# Patient Record
Sex: FEMALE | Race: WHITE | NOT HISPANIC OR LATINO | Employment: OTHER | URBAN - METROPOLITAN AREA
[De-identification: names, ages, dates, MRNs, and addresses within clinical notes are randomized per-mention and may not be internally consistent; named-entity substitution may affect disease eponyms.]

---

## 2023-10-20 ENCOUNTER — OFFICE VISIT (OUTPATIENT)
Dept: URGENT CARE | Facility: CLINIC | Age: 63
End: 2023-10-20
Payer: COMMERCIAL

## 2023-10-20 VITALS
HEIGHT: 61 IN | WEIGHT: 131 LBS | DIASTOLIC BLOOD PRESSURE: 60 MMHG | HEART RATE: 82 BPM | TEMPERATURE: 98 F | RESPIRATION RATE: 18 BRPM | SYSTOLIC BLOOD PRESSURE: 106 MMHG | BODY MASS INDEX: 24.73 KG/M2 | OXYGEN SATURATION: 98 %

## 2023-10-20 DIAGNOSIS — S90.812A ABRASION OF LEFT HEEL, INITIAL ENCOUNTER: Primary | ICD-10-CM

## 2023-10-20 PROCEDURE — 99203 PR OFFICE/OUTPT VISIT, NEW, LEVL III, 30-44 MIN: ICD-10-PCS | Mod: S$GLB,,,

## 2023-10-20 PROCEDURE — 99203 OFFICE O/P NEW LOW 30 MIN: CPT | Mod: S$GLB,,,

## 2023-10-20 NOTE — PROGRESS NOTES
"Subjective:      Patient ID: Alexandria Tompkins is a 63 y.o. female.    Vitals:  height is 5' 1" (1.549 m) and weight is 59.4 kg (131 lb). Her oral temperature is 98.4 °F (36.9 °C). Her blood pressure is 106/60 and her pulse is 82. Her respiration is 18 and oxygen saturation is 98%.     Chief Complaint: Heel Pain (Left heel cracking x 2 days)    This is a 63 y.o. female who presents today with a chief complaint of  Patient presents with Heel Pain, Left heel cracking x 2 days        Foot Injury   The incident occurred 1 to 3 hours ago. The incident occurred at home. There was no injury mechanism. The pain is present in the left heel. The pain is at a severity of 8/10. The pain is moderate. The pain has been Constant since onset. Associated symptoms include an inability to bear weight. She reports no foreign bodies present. The symptoms are aggravated by weight bearing and movement. She has tried non-weight bearing for the symptoms. The treatment provided no relief.       Constitution: Negative.   HENT: Negative.     Neck: neck negative.   Cardiovascular: Negative.    Eyes: Negative.    Respiratory: Negative.     Gastrointestinal: Negative.    Endocrine: negative.   Genitourinary: Negative.    Musculoskeletal:  Positive for joint pain.   Skin:         Cracked dry skin on left heel   Allergic/Immunologic: Negative.    Neurological: Negative.    Hematologic/Lymphatic: Negative.       Objective:     Physical Exam   Constitutional: She is oriented to person, place, and time.   HENT:   Head: Normocephalic and atraumatic.   Eyes: Conjunctivae are normal. Pupils are equal, round, and reactive to light. Extraocular movement intact   Neck: Neck supple.   Cardiovascular: Normal rate and normal pulses.   Pulmonary/Chest: Effort normal.   Abdominal: Normal appearance.   Musculoskeletal: Normal range of motion.         General: Normal range of motion.   Neurological: no focal deficit. She is alert, oriented to person, place, and time " and at baseline.   Skin:         Comments: See image of left heel   Psychiatric: Her behavior is normal. Mood, judgment and thought content normal.   Nursing note and vitals reviewed.      Assessment:     1. Abrasion of left heel, initial encounter          Plan:    Plan discussed with patient and in patient D/C papers    Abrasion of left heel, initial encounter  -     Ambulatory referral/consult to Podiatry

## 2023-10-20 NOTE — PATIENT INSTRUCTIONS
You were given a referral today, if you don not hear from someone within 3 business days please call the patient referral number at [phone] to schedule your referral appointment.      Its easy for the skin on your feet to become dry and cracked, especially in the winter. Fortunately, there are steps you can take to treat dry, cracked heels at home and prevent them from coming back.    To care for dry, cracked heels, follow these tips from board-certified dermatologists.    Limit baths and showers to 5-10 minutes. Bathing for too long can dry out the skin, making dry, cracked heels worse. Be gentle when blotting your skin dry with a towel.    Use a gentle, fragrance-free cleanser. This will help your feet retain their natural oils.    Moisturize within 5 minutes of bathing. Look for a moisturizing cream that contains 10-25% urea, alpha hydroxy acid, or salicylic acid, and apply it to your heels immediately after bathing while your skin is still damp and whenever your heels feel dry to lock in moisture.    Before bed, apply plain petroleum jelly. Consider wearing socks at night to avoid getting grease on your bedding.    Protect your heels. During the day, apply a liquid bandage over the cracks in your heels to create a protective barrier, which can help reduce pain, speed up healing, and stop germs from entering your skin.    Wear the proper shoes. If you have dry, cracked heels, avoid open-heeled shoes, such as flip flops or slingbacks, shoes that are worn down, or shoes that dont fit properly.

## 2023-10-23 ENCOUNTER — HOSPITAL ENCOUNTER (EMERGENCY)
Facility: HOSPITAL | Age: 63
Discharge: HOME | End: 2023-10-23
Attending: EMERGENCY MEDICINE
Payer: COMMERCIAL

## 2023-10-23 VITALS
WEIGHT: 134 LBS | HEART RATE: 102 BPM | SYSTOLIC BLOOD PRESSURE: 115 MMHG | HEIGHT: 62 IN | BODY MASS INDEX: 24.66 KG/M2 | RESPIRATION RATE: 12 BRPM | TEMPERATURE: 99 F | OXYGEN SATURATION: 95 % | DIASTOLIC BLOOD PRESSURE: 69 MMHG

## 2023-10-23 DIAGNOSIS — R55 SYNCOPE: ICD-10-CM

## 2023-10-23 DIAGNOSIS — R52 PAIN: ICD-10-CM

## 2023-10-23 DIAGNOSIS — M79.672 PAIN OF LEFT HEEL: Primary | ICD-10-CM

## 2023-10-23 LAB
ALBUMIN SERPL BCP-MCNC: 3.7 G/DL (ref 3.5–5.2)
ALP SERPL-CCNC: 277 U/L (ref 55–135)
ALT SERPL W/O P-5'-P-CCNC: 189 U/L (ref 10–44)
ANION GAP SERPL CALC-SCNC: 9 MMOL/L (ref 8–16)
AST SERPL-CCNC: 234 U/L (ref 10–40)
BASOPHILS # BLD AUTO: 0.02 K/UL (ref 0–0.2)
BASOPHILS NFR BLD: 0.3 % (ref 0–1.9)
BILIRUB SERPL-MCNC: 0.5 MG/DL (ref 0.1–1)
BUN SERPL-MCNC: 18 MG/DL (ref 8–23)
CALCIUM SERPL-MCNC: 9.6 MG/DL (ref 8.7–10.5)
CHLORIDE SERPL-SCNC: 103 MMOL/L (ref 95–110)
CO2 SERPL-SCNC: 23 MMOL/L (ref 23–29)
CREAT SERPL-MCNC: 0.9 MG/DL (ref 0.5–1.4)
DIFFERENTIAL METHOD: ABNORMAL
EOSINOPHIL # BLD AUTO: 0.1 K/UL (ref 0–0.5)
EOSINOPHIL NFR BLD: 1.5 % (ref 0–8)
ERYTHROCYTE [DISTWIDTH] IN BLOOD BY AUTOMATED COUNT: 12.4 % (ref 11.5–14.5)
EST. GFR  (NO RACE VARIABLE): >60 ML/MIN/1.73 M^2
GLUCOSE SERPL-MCNC: 279 MG/DL (ref 70–110)
HCT VFR BLD AUTO: 42.2 % (ref 37–48.5)
HGB BLD-MCNC: 13.9 G/DL (ref 12–16)
IMM GRANULOCYTES # BLD AUTO: 0.02 K/UL (ref 0–0.04)
IMM GRANULOCYTES NFR BLD AUTO: 0.3 % (ref 0–0.5)
LACTATE SERPL-SCNC: 1 MMOL/L (ref 0.5–2.2)
LYMPHOCYTES # BLD AUTO: 0.8 K/UL (ref 1–4.8)
LYMPHOCYTES NFR BLD: 10.9 % (ref 18–48)
MCH RBC QN AUTO: 30.2 PG (ref 27–31)
MCHC RBC AUTO-ENTMCNC: 32.9 G/DL (ref 32–36)
MCV RBC AUTO: 92 FL (ref 82–98)
MONOCYTES # BLD AUTO: 0.2 K/UL (ref 0.3–1)
MONOCYTES NFR BLD: 2.9 % (ref 4–15)
NEUTROPHILS # BLD AUTO: 6 K/UL (ref 1.8–7.7)
NEUTROPHILS NFR BLD: 84.1 % (ref 38–73)
NRBC BLD-RTO: 0 /100 WBC
PLATELET # BLD AUTO: 244 K/UL (ref 150–450)
PMV BLD AUTO: 10 FL (ref 9.2–12.9)
POCT GLUCOSE: 275 MG/DL (ref 70–110)
POTASSIUM SERPL-SCNC: 4.3 MMOL/L (ref 3.5–5.1)
PROT SERPL-MCNC: 7.8 G/DL (ref 6–8.4)
RBC # BLD AUTO: 4.6 M/UL (ref 4–5.4)
SODIUM SERPL-SCNC: 135 MMOL/L (ref 136–145)
WBC # BLD AUTO: 7.14 K/UL (ref 3.9–12.7)

## 2023-10-23 PROCEDURE — 96375 TX/PRO/DX INJ NEW DRUG ADDON: CPT

## 2023-10-23 PROCEDURE — 73630 X-RAY EXAM OF FOOT: CPT | Mod: TC,LT

## 2023-10-23 PROCEDURE — 93010 ELECTROCARDIOGRAM REPORT: CPT | Mod: ,,, | Performed by: INTERNAL MEDICINE

## 2023-10-23 PROCEDURE — 99285 EMERGENCY DEPT VISIT HI MDM: CPT

## 2023-10-23 PROCEDURE — 73630 XR FOOT COMPLETE 3 VIEW LEFT: ICD-10-PCS | Mod: 26,LT,, | Performed by: RADIOLOGY

## 2023-10-23 PROCEDURE — 83605 ASSAY OF LACTIC ACID: CPT | Performed by: NURSE PRACTITIONER

## 2023-10-23 PROCEDURE — 80053 COMPREHEN METABOLIC PANEL: CPT | Performed by: NURSE PRACTITIONER

## 2023-10-23 PROCEDURE — 96374 THER/PROPH/DIAG INJ IV PUSH: CPT

## 2023-10-23 PROCEDURE — 63600175 PHARM REV CODE 636 W HCPCS: Performed by: NURSE PRACTITIONER

## 2023-10-23 PROCEDURE — 93005 ELECTROCARDIOGRAM TRACING: CPT

## 2023-10-23 PROCEDURE — 73630 X-RAY EXAM OF FOOT: CPT | Mod: 26,LT,, | Performed by: RADIOLOGY

## 2023-10-23 PROCEDURE — 93010 EKG 12-LEAD: ICD-10-PCS | Mod: ,,, | Performed by: INTERNAL MEDICINE

## 2023-10-23 PROCEDURE — 96361 HYDRATE IV INFUSION ADD-ON: CPT

## 2023-10-23 PROCEDURE — 85025 COMPLETE CBC W/AUTO DIFF WBC: CPT | Performed by: NURSE PRACTITIONER

## 2023-10-23 PROCEDURE — 25000003 PHARM REV CODE 250: Performed by: NURSE PRACTITIONER

## 2023-10-23 PROCEDURE — 82962 GLUCOSE BLOOD TEST: CPT

## 2023-10-23 RX ORDER — ONDANSETRON 2 MG/ML
4 INJECTION INTRAMUSCULAR; INTRAVENOUS
Status: COMPLETED | OUTPATIENT
Start: 2023-10-23 | End: 2023-10-23

## 2023-10-23 RX ORDER — MORPHINE SULFATE 2 MG/ML
2 INJECTION, SOLUTION INTRAMUSCULAR; INTRAVENOUS
Status: COMPLETED | OUTPATIENT
Start: 2023-10-23 | End: 2023-10-23

## 2023-10-23 RX ORDER — URSODIOL 250 MG/1
1 TABLET, FILM COATED ORAL 4 TIMES DAILY
COMMUNITY

## 2023-10-23 RX ORDER — INSULIN DEGLUDEC 100 U/ML
INJECTION, SOLUTION SUBCUTANEOUS
COMMUNITY

## 2023-10-23 RX ORDER — LEVOTHYROXINE SODIUM 200 UG/1
1 TABLET ORAL DAILY
COMMUNITY

## 2023-10-23 RX ADMIN — MORPHINE SULFATE 2 MG: 2 INJECTION, SOLUTION INTRAMUSCULAR; INTRAVENOUS at 03:10

## 2023-10-23 RX ADMIN — SODIUM CHLORIDE 500 ML: 9 INJECTION, SOLUTION INTRAVENOUS at 03:10

## 2023-10-23 RX ADMIN — ONDANSETRON 4 MG: 2 INJECTION INTRAMUSCULAR; INTRAVENOUS at 03:10

## 2023-10-23 NOTE — DISCHARGE INSTRUCTIONS
Rest, increase fluids, lots of water and liquids.  Follow up with your clinic in [place] as needed.  Or, a local clinic referral has been given to you.  Call for appointment.  No signs of infection today.  Normal EKG, normal lab work, normal WBC, normal lactic.  No need for antibiotics at present time.  Return as needed.  Tylenol and or Motrin as needed. Noted elevated liver enzymes, to follow up with clinic for recheck.

## 2023-10-23 NOTE — ED PROVIDER NOTES
Encounter Date: 10/23/2023       History     Chief Complaint   Patient presents with    General Illness     Pt reports a wound to the left heel with reports of worsening pain/numbness moving up her left leg. Pt reports concerns of possible infection. Pt did have a syncopal episode while getting out of her vehicle into the wheelchair.      POV to ED with .  Patient complains of wound to her left heel.  States that she noticed the wound 10/19/23.  States that she has had similar wounds in the past.  States that she went to a local urgent care for antibiotics.  States that she was told she did not eat antibiotics.  She was sent home, told to keep Vaseline on the heel.  Patient states that she has been doing home therapy for her left heel.  Stating that it does look much better now.  But, she knows that infection has spread from the left heel all the way to her hip.  Patient was requesting antibiotics.  She denies fever or vomiting.  States that they are relocating from a location in the [place], about 5 hours away.  Moving to this area.  They will need local referrals for this area.  While getting out of the car, RN reporting that patient stated her heel pain was such that she may pass out.  At exam interview, patient denies chest pain or shortness of breadth.  No abdominal pain.  No other complaints.    The history is provided by the patient and the spouse.     Review of patient's allergies indicates:   Allergen Reactions    Sulfa (sulfonamide antibiotics)      Past Medical History:   Diagnosis Date    Cancer     Hypothyroidism, unspecified     Rheumatoid arthritis, unspecified     Sjogren's disease     Type 1 diabetes mellitus      Past Surgical History:   Procedure Laterality Date    PANCREAS SURGERY       No family history on file.  Social History     Tobacco Use    Smoking status: Never    Smokeless tobacco: Never     Review of Systems   Constitutional:  Negative for chills and fever.   Respiratory:   Negative for cough and shortness of breath.    Gastrointestinal:  Negative for abdominal pain, diarrhea, nausea and vomiting.   Musculoskeletal:         Left heel pain, concerns for heel infection.  Denies fall or injury   All other systems reviewed and are negative.      Physical Exam     Initial Vitals [10/23/23 1452]   BP Pulse Resp Temp SpO2   135/84 97 18 98.8 °F (37.1 °C) 100 %      MAP       --         Physical Exam    Nursing note and vitals reviewed.  Constitutional: She appears well-developed and well-nourished. No distress.   HENT:   Head: Normocephalic and atraumatic.   Mouth/Throat: Oropharynx is clear and moist.   Eyes: Pupils are equal, round, and reactive to light.   Cardiovascular:  Normal rate and regular rhythm.           Murmur heard.  Pulmonary/Chest: Breath sounds normal. No respiratory distress.   Abdominal: Abdomen is soft. Bowel sounds are normal. There is no abdominal tenderness.   Musculoskeletal:         General: Normal range of motion.      Comments: Noting dry cracked heels, left greater than right.  No redness or warmth.  No swelling.  No streaking, reporting tenderness to the left heel     Neurological: She is alert and oriented to person, place, and time. No sensory deficit. GCS score is 15. GCS eye subscore is 4. GCS verbal subscore is 5. GCS motor subscore is 6.   Skin: Skin is warm and dry. Capillary refill takes 2 to 3 seconds.   Psychiatric:   Flat affect, good eye contact         ED Course   Procedures  Labs Reviewed   CBC W/ AUTO DIFFERENTIAL - Abnormal; Notable for the following components:       Result Value    Lymph # 0.8 (*)     Mono # 0.2 (*)     Gran % 84.1 (*)     Lymph % 10.9 (*)     Mono % 2.9 (*)     All other components within normal limits   COMPREHENSIVE METABOLIC PANEL - Abnormal; Notable for the following components:    Sodium 135 (*)     Glucose 279 (*)     Alkaline Phosphatase 277 (*)      (*)      (*)     All other components within normal  limits   POCT GLUCOSE - Abnormal; Notable for the following components:    POCT Glucose 275 (*)     All other components within normal limits   LACTIC ACID, PLASMA     EKG Readings: (Independently Interpreted)   Initial Reading: No STEMI. Rhythm: Normal Sinus Rhythm. Heart Rate: 96. Ectopy: No Ectopy. Conduction: Normal. ST Segments: Normal ST Segments. T Waves: Normal. Axis: Normal. Clinical Impression: Normal Sinus Rhythm Other Impression: No acute STEMI   @ 1442 interpreted, reviewed and discussed with Dr. Segura     ECG Results              EKG 12-lead (Final result)  Result time 10/23/23 17:01:49      Final result by Interface, Lab In [facility] (10/23/23 17:01:49)                   Narrative:    Test Reason : R55,    Vent. Rate : 096 BPM     Atrial Rate : 096 BPM     P-R Int : 152 ms          QRS Dur : 088 ms      QT Int : 334 ms       P-R-T Axes : 048 046 056 degrees     QTc Int : 421 ms    Normal sinus rhythm  Normal ECG  No previous ECGs available  Confirmed by Colin Milan MD ([id number]) on 10/23/2023 5:01:42 PM    Referred By: [email or web address]   SELF           Confirmed By:Colin Milan MD                                  Imaging Results              X-Ray Foot Complete Left (Final result)  Result time 10/23/23 15:36:00      Final result by Prasanna Caban MD (10/23/23 15:36:00)                   Impression:      No acute radiographic findings of the left foot.      Electronically signed by: Prasanna Caban  Date:    10/23/2023  Time:    15:36               Narrative:    EXAMINATION:  XR FOOT COMPLETE 3 VIEW LEFT    CLINICAL HISTORY:  . Pain, unspecified    TECHNIQUE:  AP, lateral, and oblique views of the left foot were performed.    COMPARISON:  None    FINDINGS:  No acute fracture or dislocation.  No significant soft tissue swelling.    The joint spaces are preserved.  The tarsal bones are normal in appearance.  Normal tarsometatarsal alignment.    No radiopaque foreign body.                                    X-Rays:    Independently Interpreted Readings:   Other Readings:  EXAMINATION:  XR FOOT COMPLETE 3 VIEW LEFT     CLINICAL HISTORY:  . Pain, unspecified     TECHNIQUE:  AP, lateral, and oblique views of the left foot were performed.     COMPARISON:  None     FINDINGS:  No acute fracture or dislocation.  No significant soft tissue swelling.     The joint spaces are preserved.  The tarsal bones are normal in appearance.  Normal tarsometatarsal alignment.     No radiopaque foreign body.     Impression:     No acute radiographic findings of the left foot.        Medications   sodium chloride 0.9% bolus 500 mL 500 mL (0 mLs Intravenous Stopped 10/23/23 1606)   morphine injection 2 mg (2 mg Intravenous Given 10/23/23 1529)   ondansetron injection 4 mg (4 mg Intravenous Given 10/23/23 1529)     Medical Decision Making  Presents for evaluation of left heel pain, concern for infection, requesting antibiotics.  Lab work ordered, x-ray ordered.  Disposition pending  Differentials including but limited to dehydration, volume depletion, abnormal electrolytes, fracture, sprain, strain, abscess, cellulitis, heel spur  @ 1612 awaiting lab results  Plan of care, patient will be discharged home.  Diagnosis heel pain.  EKG sinus rhythm, orthostatics within normal limits, lab work unremarkable other than elevated blood sugar, history of diabetes.  Lactic within normal limits. Elevated liver enzymes, clinic referral for recheck. No signs of infection in her left heel.  She will be discharged home.  Medicated for pain in the ED.  To continue Tylenol and or Motrin as needed at home.  Clinic referral information is given for her to call to follow up with.  Again, patient requests antibiotics.  She was advised there was not a need for antibiotics at present time for an infection, that there is not an infection in her left heel. Discussed with Dr Segura    Amount and/or Complexity of Data Reviewed  Independent Historian: spouse     Details:  Spouse  Labs: ordered. Decision-making details documented in ED Course.  Radiology: ordered. Decision-making details documented in ED Course.  ECG/medicine tests: ordered. Decision-making details documented in ED Course.    Risk  OTC drugs.  Prescription drug management.               ED Course as of 10/23/23 1847   Mon Oct 23, 2023   1720 Comprehensive Metabolic Panel (CMP)   Final result 10/23 1520    Sodium 135  Glucose 279          POCT glucose                 Final result 10/23 1436    POCT Glucose 275       Complete Blood Count (CBC)                 Final result 10/23 1520    Lymph # 0.8  Mono # 0.2  Gran % 84.1  Lymph % 10.9  Mono % 2.9       [CP]      ED Course User Index  [CP] Alice Falk NP                    Clinical Impression:   Final diagnoses:  [R55] Syncope  [R52] Pain  [M79.672] Pain of left heel - atraumatic, no signs of infection (Primary)        ED Disposition Condition    Discharge Stable          ED Prescriptions    None       Follow-up Information       Follow up With Specialties Details Why Contact Info    Kay Mitchell MD Family Medicine In 3 days  [address] [facility]  [address]  [place] [id number] [phone]      Juan Pablo Denson DPM Podiatry Call in 3 days  [address] [place] [address]               Alice Falk NP  10/23/23 1612       Alice Falk NP  10/23/23 1718       Alice Falk NP  10/23/23 1720       Alice Falk NP  10/23/23 1847

## 2024-03-25 ENCOUNTER — HOSPITAL ENCOUNTER (EMERGENCY)
Facility: HOSPITAL | Age: 64
Discharge: HOME | End: 2024-03-25
Attending: EMERGENCY MEDICINE
Payer: COMMERCIAL

## 2024-03-25 VITALS
OXYGEN SATURATION: 96 % | SYSTOLIC BLOOD PRESSURE: 125 MMHG | BODY MASS INDEX: 24.66 KG/M2 | WEIGHT: 134 LBS | RESPIRATION RATE: 19 BRPM | TEMPERATURE: 98 F | HEART RATE: 70 BPM | HEIGHT: 62 IN | DIASTOLIC BLOOD PRESSURE: 58 MMHG

## 2024-03-25 DIAGNOSIS — Z79.4 DIABETES MELLITUS DUE TO UNDERLYING CONDITION WITHOUT COMPLICATION, WITH LONG-TERM CURRENT USE OF INSULIN: ICD-10-CM

## 2024-03-25 DIAGNOSIS — R07.9 CHEST PAIN: Primary | ICD-10-CM

## 2024-03-25 DIAGNOSIS — R20.2 NUMBNESS AND TINGLING IN LEFT HAND: ICD-10-CM

## 2024-03-25 DIAGNOSIS — R20.0 NUMBNESS AND TINGLING IN LEFT HAND: ICD-10-CM

## 2024-03-25 DIAGNOSIS — E08.9 DIABETES MELLITUS DUE TO UNDERLYING CONDITION WITHOUT COMPLICATION, WITH LONG-TERM CURRENT USE OF INSULIN: ICD-10-CM

## 2024-03-25 DIAGNOSIS — R29.818 ACUTE FOCAL NEUROLOGICAL DEFICIT: ICD-10-CM

## 2024-03-25 DIAGNOSIS — Z85.07 H/O PANCREATIC CANCER: ICD-10-CM

## 2024-03-25 DIAGNOSIS — N30.00 ACUTE CYSTITIS WITHOUT HEMATURIA: ICD-10-CM

## 2024-03-25 LAB
ALBUMIN SERPL BCP-MCNC: 3.5 G/DL (ref 3.5–5.2)
ALP SERPL-CCNC: 122 U/L (ref 55–135)
ALT SERPL W/O P-5'-P-CCNC: 35 U/L (ref 10–44)
ANION GAP SERPL CALC-SCNC: 10 MMOL/L (ref 8–16)
AST SERPL-CCNC: 32 U/L (ref 10–40)
BACTERIA #/AREA URNS HPF: ABNORMAL /HPF
BASOPHILS # BLD AUTO: 0.04 K/UL (ref 0–0.2)
BASOPHILS NFR BLD: 0.4 % (ref 0–1.9)
BILIRUB SERPL-MCNC: 0.5 MG/DL (ref 0.1–1)
BILIRUB UR QL STRIP: NEGATIVE
BNP SERPL-MCNC: 21 PG/ML (ref 0–99)
BUN SERPL-MCNC: 22 MG/DL (ref 8–23)
CALCIUM SERPL-MCNC: 9.5 MG/DL (ref 8.7–10.5)
CHLORIDE SERPL-SCNC: 105 MMOL/L (ref 95–110)
CHOLEST SERPL-MCNC: 134 MG/DL (ref 120–199)
CHOLEST/HDLC SERPL: 2.6 {RATIO} (ref 2–5)
CLARITY UR: CLEAR
CO2 SERPL-SCNC: 23 MMOL/L (ref 23–29)
COLOR UR: YELLOW
CREAT SERPL-MCNC: 0.8 MG/DL (ref 0.5–1.4)
DIFFERENTIAL METHOD BLD: NORMAL
EOSINOPHIL # BLD AUTO: 0.3 K/UL (ref 0–0.5)
EOSINOPHIL NFR BLD: 3.2 % (ref 0–8)
ERYTHROCYTE [DISTWIDTH] IN BLOOD BY AUTOMATED COUNT: 12.7 % (ref 11.5–14.5)
EST. GFR  (NO RACE VARIABLE): >60 ML/MIN/1.73 M^2
GLUCOSE SERPL-MCNC: 160 MG/DL (ref 70–110)
GLUCOSE UR QL STRIP: NEGATIVE
HCT VFR BLD AUTO: 39.6 % (ref 37–48.5)
HDLC SERPL-MCNC: 51 MG/DL (ref 40–75)
HDLC SERPL: 38.1 % (ref 20–50)
HGB BLD-MCNC: 13.3 G/DL (ref 12–16)
HGB UR QL STRIP: ABNORMAL
IMM GRANULOCYTES # BLD AUTO: 0.02 K/UL (ref 0–0.04)
IMM GRANULOCYTES NFR BLD AUTO: 0.2 % (ref 0–0.5)
INR PPP: 1 (ref 0.8–1.2)
KETONES UR QL STRIP: NEGATIVE
LDLC SERPL CALC-MCNC: 68.8 MG/DL (ref 63–159)
LEUKOCYTE ESTERASE UR QL STRIP: ABNORMAL
LYMPHOCYTES # BLD AUTO: 2.8 K/UL (ref 1–4.8)
LYMPHOCYTES NFR BLD: 31.3 % (ref 18–48)
MCH RBC QN AUTO: 30.9 PG (ref 27–31)
MCHC RBC AUTO-ENTMCNC: 33.6 G/DL (ref 32–36)
MCV RBC AUTO: 92 FL (ref 82–98)
MICROSCOPIC COMMENT: ABNORMAL
MONOCYTES # BLD AUTO: 0.6 K/UL (ref 0.3–1)
MONOCYTES NFR BLD: 6.9 % (ref 4–15)
NEUTROPHILS # BLD AUTO: 5.2 K/UL (ref 1.8–7.7)
NEUTROPHILS NFR BLD: 58 % (ref 38–73)
NITRITE UR QL STRIP: NEGATIVE
NON-SQ EPI CELLS #/AREA URNS HPF: 2 /HPF
NONHDLC SERPL-MCNC: 83 MG/DL
NRBC BLD-RTO: 0 /100 WBC
OHS QRS DURATION: 96 MS
OHS QTC CALCULATION: 411 MS
PH UR STRIP: 6 [PH] (ref 5–8)
PLATELET # BLD AUTO: 278 K/UL (ref 150–450)
PMV BLD AUTO: 9.6 FL (ref 9.2–12.9)
POC PTINR: 1.1 (ref 0.9–1.2)
POCT GLUCOSE: 172 MG/DL (ref 70–110)
POCT GLUCOSE: 175 MG/DL (ref 70–110)
POCT GLUCOSE: 73 MG/DL (ref 70–110)
POTASSIUM SERPL-SCNC: 4.4 MMOL/L (ref 3.5–5.1)
PROT SERPL-MCNC: 7 G/DL (ref 6–8.4)
PROT UR QL STRIP: NEGATIVE
PROTHROMBIN TIME: 10.7 SEC (ref 9–12.5)
RBC # BLD AUTO: 4.3 M/UL (ref 4–5.4)
RBC #/AREA URNS HPF: 17 /HPF (ref 0–4)
SAMPLE: NORMAL
SODIUM SERPL-SCNC: 138 MMOL/L (ref 136–145)
SP GR UR STRIP: >1.03 (ref 1–1.03)
TRIGL SERPL-MCNC: 71 MG/DL (ref 30–150)
TROPONIN I SERPL DL<=0.01 NG/ML-MCNC: <0.006 NG/ML (ref 0–0.03)
TROPONIN I SERPL DL<=0.01 NG/ML-MCNC: <0.006 NG/ML (ref 0–0.03)
TSH SERPL DL<=0.005 MIU/L-ACNC: 1.55 UIU/ML (ref 0.4–4)
URN SPEC COLLECT METH UR: ABNORMAL
UROBILINOGEN UR STRIP-ACNC: NEGATIVE EU/DL
WBC # BLD AUTO: 8.99 K/UL (ref 3.9–12.7)
WBC #/AREA URNS HPF: >100 /HPF (ref 0–5)

## 2024-03-25 PROCEDURE — 81000 URINALYSIS NONAUTO W/SCOPE: CPT | Performed by: EMERGENCY MEDICINE

## 2024-03-25 PROCEDURE — 82962 GLUCOSE BLOOD TEST: CPT

## 2024-03-25 PROCEDURE — 84484 ASSAY OF TROPONIN QUANT: CPT | Performed by: EMERGENCY MEDICINE

## 2024-03-25 PROCEDURE — 93005 ELECTROCARDIOGRAM TRACING: CPT

## 2024-03-25 PROCEDURE — 99285 EMERGENCY DEPT VISIT HI MDM: CPT | Mod: 25

## 2024-03-25 PROCEDURE — 85610 PROTHROMBIN TIME: CPT | Performed by: EMERGENCY MEDICINE

## 2024-03-25 PROCEDURE — 87086 URINE CULTURE/COLONY COUNT: CPT | Performed by: EMERGENCY MEDICINE

## 2024-03-25 PROCEDURE — 83880 ASSAY OF NATRIURETIC PEPTIDE: CPT | Performed by: EMERGENCY MEDICINE

## 2024-03-25 PROCEDURE — 25500020 PHARM REV CODE 255

## 2024-03-25 PROCEDURE — 80061 LIPID PANEL: CPT | Performed by: EMERGENCY MEDICINE

## 2024-03-25 PROCEDURE — 85025 COMPLETE CBC W/AUTO DIFF WBC: CPT | Performed by: EMERGENCY MEDICINE

## 2024-03-25 PROCEDURE — 36415 COLL VENOUS BLD VENIPUNCTURE: CPT | Performed by: EMERGENCY MEDICINE

## 2024-03-25 PROCEDURE — 87077 CULTURE AEROBIC IDENTIFY: CPT | Performed by: EMERGENCY MEDICINE

## 2024-03-25 PROCEDURE — 93010 ELECTROCARDIOGRAM REPORT: CPT | Mod: ,,, | Performed by: GENERAL PRACTICE

## 2024-03-25 PROCEDURE — 85610 PROTHROMBIN TIME: CPT

## 2024-03-25 PROCEDURE — 84443 ASSAY THYROID STIM HORMONE: CPT | Performed by: EMERGENCY MEDICINE

## 2024-03-25 PROCEDURE — 87186 SC STD MICRODIL/AGAR DIL: CPT | Performed by: EMERGENCY MEDICINE

## 2024-03-25 PROCEDURE — 99900035 HC TECH TIME PER 15 MIN (STAT)

## 2024-03-25 PROCEDURE — 80053 COMPREHEN METABOLIC PANEL: CPT | Performed by: EMERGENCY MEDICINE

## 2024-03-25 PROCEDURE — 25000003 PHARM REV CODE 250: Performed by: EMERGENCY MEDICINE

## 2024-03-25 RX ORDER — CLOPIDOGREL BISULFATE 75 MG/1
75 TABLET ORAL DAILY
Status: DISCONTINUED | OUTPATIENT
Start: 2024-03-26 | End: 2024-03-25

## 2024-03-25 RX ORDER — CIPROFLOXACIN 500 MG/1
500 TABLET ORAL 2 TIMES DAILY
Qty: 20 TABLET | Refills: 0 | Status: SHIPPED | OUTPATIENT
Start: 2024-03-25 | End: 2024-03-25 | Stop reason: CLARIF

## 2024-03-25 RX ORDER — CLOPIDOGREL BISULFATE 75 MG/1
75 TABLET ORAL DAILY
Status: DISCONTINUED | OUTPATIENT
Start: 2024-03-25 | End: 2024-03-25 | Stop reason: HOSPADM

## 2024-03-25 RX ORDER — NAPROXEN SODIUM 220 MG/1
81 TABLET, FILM COATED ORAL
Status: COMPLETED | OUTPATIENT
Start: 2024-03-25 | End: 2024-03-25

## 2024-03-25 RX ORDER — NITROFURANTOIN 25; 75 MG/1; MG/1
100 CAPSULE ORAL 2 TIMES DAILY
Qty: 10 CAPSULE | Refills: 0 | Status: SHIPPED | OUTPATIENT
Start: 2024-03-25 | End: 2024-03-30

## 2024-03-25 RX ORDER — CLOPIDOGREL BISULFATE 75 MG/1
75 TABLET ORAL DAILY
Qty: 21 TABLET | Refills: 0 | OUTPATIENT
Start: 2024-03-25 | End: 2024-04-04

## 2024-03-25 RX ORDER — ASPIRIN 81 MG/1
81 TABLET ORAL DAILY
Qty: 30 TABLET | Refills: 0 | Status: SHIPPED | OUTPATIENT
Start: 2024-03-25 | End: 2025-03-25

## 2024-03-25 RX ADMIN — ASPIRIN 81 MG CHEWABLE TABLET 81 MG: 81 TABLET CHEWABLE at 04:03

## 2024-03-25 RX ADMIN — CLOPIDOGREL BISULFATE 75 MG: 75 TABLET, FILM COATED ORAL at 04:03

## 2024-03-25 RX ADMIN — IOHEXOL 75 ML: 350 INJECTION, SOLUTION INTRAVENOUS at 11:03

## 2024-03-25 NOTE — ED PROVIDER NOTES
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  Patient states symptoms began around 5:30 p.m. last night and patient went to bed around 6pm.  She woke up this morning and the symptoms were still there.  Review of patient's allergies indicates:   Allergen Reactions    Sulfa (sulfonamide antibiotics)      Past Medical History:   Diagnosis Date    Cancer     Hypothyroidism, unspecified     Rheumatoid arthritis, unspecified     Sjogren's disease     Type 1 diabetes mellitus      Past Surgical History:   Procedure Laterality Date    PANCREAS SURGERY       No family history on file.  Social History     Tobacco Use    Smoking status: Never    Smokeless tobacco: Never     Review of Systems   Constitutional:  Negative for fever.   HENT:  Negative for sore throat.    Respiratory:  Positive for choking (sensation). Negative for shortness of breath.    Cardiovascular:  Positive for chest pain.   Gastrointestinal:  Negative for nausea.   Genitourinary:  Negative for dysuria.   Musculoskeletal:  Positive for gait problem. Negative for back pain.   Skin:  Negative for rash.   Neurological:  Positive for dizziness, weakness and numbness.   Hematological:  Does not bruise/bleed easily.       Physical Exam     Initial Vitals [03/25/24 1104]   BP Pulse Resp Temp SpO2   (!) 108/57 89 18 98 °F (36.7 °C) 98 %      MAP       --         Physical Exam    Constitutional: Vital signs are normal. She appears well-developed and well-nourished.  Non-toxic appearance. No distress.   HENT:   Head: Normocephalic and atraumatic.   Eyes: EOM are normal. Pupils are  equal, round, and reactive to light.   Neck: Neck supple. No JVD present.   Normal range of motion.  Cardiovascular:  Normal rate, regular rhythm, normal heart sounds and intact distal pulses.     Exam reveals no gallop and no friction rub.       No murmur heard.  Pulmonary/Chest: Breath sounds normal. She has no wheezes. She has no rhonchi. She has no rales.   Abdominal: Abdomen is soft. Bowel sounds are normal. There is no abdominal tenderness. There is no rebound and no guarding.   Musculoskeletal:         General: Normal range of motion.      Cervical back: Normal range of motion and neck supple. No rigidity.     Neurological: She is alert and oriented to person, place, and time. She has normal reflexes. A sensory deficit is present. No cranial nerve deficit. She exhibits abnormal muscle tone. Coordination (dysmetria with finger-to-nose with the left upper extremity but not the right) abnormal. GCS eye subscore is 4. GCS verbal subscore is 5. GCS motor subscore is 6.   Paresthesia in her left hand and forearm.  Decreased  strength in the left hand compared to the right   Skin: Skin is warm and dry.   Psychiatric: She has a normal mood and affect. Her speech is normal and behavior is normal. She is not actively hallucinating.         ED Course   Procedures  Labs Reviewed   COMPREHENSIVE METABOLIC PANEL - Abnormal; Notable for the following components:       Result Value    Glucose 160 (*)     All other components within normal limits   URINALYSIS, REFLEX TO URINE CULTURE - Abnormal; Notable for the following components:    Specific Gravity, UA >1.030 (*)     Occult Blood UA 2+ (*)     Leukocytes, UA 3+ (*)     All other components within normal limits    Narrative:     Specimen Source->Urine   URINALYSIS MICROSCOPIC - Abnormal; Notable for the following components:    RBC, UA 17 (*)     WBC, UA >100 (*)     Non-Squam Epith 2 (*)     All other components within normal limits    Narrative:     Specimen  Source->Urine   POCT GLUCOSE - Abnormal; Notable for the following components:    POCT Glucose 172 (*)     All other components within normal limits   POCT GLUCOSE - Abnormal; Notable for the following components:    POCT Glucose 175 (*)     All other components within normal limits   CULTURE, URINE   CBC W/ AUTO DIFFERENTIAL   PROTIME-INR   TSH   LIPID PANEL   TROPONIN I   B-TYPE NATRIURETIC PEPTIDE   TROPONIN I   POCT GLUCOSE, HAND-HELD DEVICE   ISTAT PROCEDURE   POCT GLUCOSE   POCT PROTIME-INR     EKG Readings: (Independently Interpreted)   Rhythm: Normal Sinus Rhythm. Heart Rate: 69. Ectopy: No Ectopy. Conduction: Normal. ST Segments: Normal ST Segments. T Waves: Normal. Clinical Impression: Normal Sinus Rhythm       Imaging Results              X-Ray Chest AP Portable (Final result)  Result time 03/25/24 17:11:17      Final result by Zack Chowdhury MD (03/25/24 17:11:17)                   Impression:      No acute cardiopulmonary process.      Electronically signed by: Zack Chowdhury  Date:    03/25/2024  Time:    17:11               Narrative:    EXAMINATION:  XR CHEST AP PORTABLE    CLINICAL HISTORY:  Chest pain, unspecified    TECHNIQUE:  Single frontal view of the chest was performed.    COMPARISON:  None    FINDINGS:  Cardiomediastinal silhouette is within normal limits.  Lungs are well expanded and clear.  No focal consolidation, pneumothorax, or pleural effusion.  No acute bony changes.                                       MRI Brain Without Contrast (Final result)  Result time 03/25/24 15:11:29      Final result by Darshan Jain MD (03/25/24 15:11:29)                   Impression:      There is no acute abnormality.      Electronically signed by: Darshan Jain MD  Date:    03/25/2024  Time:    15:11               Narrative:    EXAM:  MRI BRAIN WITHOUT CONTRAST    CLINICAL HISTORY:  Neuro deficit, acute, stroke suspected; .    COMPARISON:  CTA head and neck performed at 11:24  hours    FINDINGS:  Intracranial contents:There is no acute abnormality.  Specifically, there are no regions of restricted diffusion to suggest acute infarction.  There is no intracranial hemorrhage.  There is no mass.  There is mild volume loss.  There is no hydrocephalus or midline shift.  There is mild nonspecific white matter change.  This is present in the periventricular and scattered subcortical white matter.  These minimal white matter changes likely reflect sequelae of chronic small vessel disease.  Additionally, there is some focal gliosis in right parietal lobe.  There is no abnormal extra-axial fluid collection.  The basilar cisterns are open.  Flow voids indicating patency are present in the major vessels at the base of the brain.  The cerebellar tonsils remain in normal position.  Sellar structures are normal.  The patient has had bilateral lens replacement surgery.  The orbits are otherwise grossly normal.    Extracranial contents, calvarium, soft tissues:The paranasal sinuses and mastoid air cells are grossly clear.  Baseline marrow signal is normal.                                       CTA Head and Neck (xpd) (Final result)  Result time 03/25/24 11:51:23      Final result by Darshan Jain MD (03/25/24 11:51:23)                   Impression:      1. There is no acute intracranial abnormality.  It should be noted that MRI is more sensitive in the detection of subtle or acute nonhemorrhagic ischemic disease.  2. There is no measurable stenosis of the cervical vertebral or carotid arteries.  3. The intracranial arteries are patent without large vessel occlusion or critical stenosis.  Results of stroke alert CTA head and neck were related to Dr. Thornton the via secure chat at the time of dictation (11:50 on 03/25/2024).  Results were acknowledged immediately by Dr. Thornton.      Electronically signed by: Darshan Jain MD  Date:    03/25/2024  Time:    11:51               Narrative:     EXAMINATION:  CTA HEAD AND NECK (XPD)    CLINICAL HISTORY:  Neuro deficit, acute, stroke suspected;    TECHNIQUE:  Non contrast low dose axial images were obtained thought the head.  CT angiogram was performed from the level of the jak to the top of the head following the IV administration of 75 mL of Omnipaque 350.   Sagittal and coronal reconstructions and maximum intensity projection reconstructions were performed. Arterial stenosis percentages are based on NASCET measurement criteria.    COMPARISON:  None    FINDINGS:  Head CT:    There is no acute abnormality.  There is only mild volume loss.  There is no hemorrhage or mass.  The gray-white interface is preserved without acute infarction.  There is mild nonspecific white matter change.  There is no abnormal extra-axial fluid collection.  The basilar cisterns are open.  Cerebellar tonsils are normal position.  There is no abnormal enhancement on the delayed post-contrast images through the brain.    There is only trace scattered mucosal thickening in the ethmoid air cells.  The mastoid air cells are clear.  The calvarium is normal.    CTA Neck:    Arch and great vessels:    There is a left-sided aortic arch with conventional anatomy.  The aortic arch and the origins of the great vessels are all widely patent.  The included subclavian arteries are patent as well.    Carotid arteries:    Right Carotid artery: There is minimal plaque at the carotid bulb.  There is, however, no stenosis of the internal carotid artery.  There is no thrombus, dissection or occlusion.  The internal carotid artery is widely patent to the skull base.    Left Carotid artery: The left common, internal and external carotid arteries are normal in caliber and contour without measurable stenosis, occlusion, thrombus or dissection.    Vertebral arteries:    The vertebral arteries are essentially codominant and patent throughout their course in the neck without occlusion, stenosis, thrombosis  or dissection.    Other:    There is mild degenerative change in the cervical spine.  The included upper lungs are slightly suboptimally evaluated due to respiratory motion but there is no mass or focal consolidation.  The airway is patent.  The prevertebral soft tissues appear normal.  There is no dominant mass within the neck.  There are nonspecific scattered lymph nodes.    CTA Head:    Anterior circulation:    The petrous and cavernous segments of the internal carotid arteries are patent.  The supraclinoid internal carotid arteries are normal as is the carotid terminus bilaterally where there is branching into the anterior and middle cerebral arteries.  There is no critical stenosis or large vessel occlusion.  There is no thrombus, dissection or large aneurysm.    Posterior circulation:    The vertebral and basilar arteries are normal in caliber and contour and widely patent.  The basilar tip is normal.  There is branching into the superior cerebellar and left posterior cerebral arteries.  There is a hypoplastic right P1 segment with fetal origin of the right posterior cerebral artery arising from the distal right anterior circulation.  This vessel is patent.  There is no aneurysm, critical stenosis or large vessel occlusion.    Dural sinuses, other:    The dural sinuses are patent.                                       Medications   clopidogreL tablet 75 mg (75 mg Oral Given 3/25/24 1610)   iohexoL (OMNIPAQUE 350) 350 mg iodine/mL injection (75 mLs Intravenous Given 3/25/24 1136)   aspirin chewable tablet 81 mg (81 mg Oral Given 3/25/24 1609)     Medical Decision Making  64-year-old woman who presents emergency department with chest pain, left upper extremity numbness, choking sensation that began yesterday evening.  On examination she does endorse some numbness in her left hand as well as some tingling in her right fingers and in her scalp.  She exhibits some decreased strength with  strength on the left  compared to the right.  Differential diagnosis includes was not limited to acute CVA, I do not believe she has having an LV 0 and is van negative.  Differential also includes electrolyte abnormality, ACS, cervical radiculopathy, lower suspicion for acute aortic dissection  POCT glucose is 175, she has not anemic with a hemoglobin of 13, no electrolyte abnormalities potassium 4.4 sodium 138, TSH 1.5, EKGs normal with a negative troponin less than 0.006.  BNP is 21.  CTA head and neck shows no acute intracranial abnormality no evidence of large vessel occlusion or critical stenosis.    Vertigo is improved, she is ambulatory with a tandem gait.  On reassessment her  strength has normalized in her left hand but she still states she feels a little bit of tingling in the fingers of both of her hands.  She has some reproduces duction of symptoms with axial loading of the cervical spine raise suspicion for or cervical radiculopathy.  MRI was also obtained showed no evidence of a CVA.  Chest x-ray was interpreted by myself showing no acute abnormalities, no mediastinal widening.  I do not think she is having aortic dissection.  Second cardiac troponin drawn 3 hours from the 1st is also undetectable at less than 0.006.  I will set the patient up for outpatient stress test.  Discussed with the patient who was in agreement.  Patient also has a urinary tract infection with greater than 100 white blood cells and 17 red blood cells in the urine.  Will prescribe Macrobid.  I will make referrals for Neurology whom I spoke to and suggested starting the patient on 21 days of baby dose of aspirin and 75 mg of Plavix.  I also made referrals for Endocrinology, Oncology (history of ampullary cancer) and primary care.    Amount and/or Complexity of Data Reviewed  Labs: ordered.  Radiology: ordered.    Risk  OTC drugs.  Prescription drug management.    Chest pain  Additional MDM:   Heart Score:    History:          Highly  suspicious.  ECG:             Normal  Age:               45-65 years  Risk factors: 1-2 risk factors  Troponin:       Less than or equal to normal limit  Heart Score = 4                                       Clinical Impression:  Final diagnoses:  [R07.9] Chest pain (Primary)  [R29.818] Acute focal neurological deficit  [N30.00] Acute cystitis without hematuria  [R20.0, R20.2] Numbness and tingling in left hand  [E08.9, Z79.4] Diabetes mellitus due to underlying condition without complication, with long-term current use of insulin  [Z85.07] H/O pancreatic cancer - ampullary          ED Disposition Condition    Discharge Stable          ED Prescriptions       Medication Sig Dispense Start Date End Date Auth. Provider    aspirin (ECOTRIN) 81 MG EC tablet Take 1 tablet (81 mg total) by mouth once daily. 30 tablet 3/25/2024 3/25/2025 Shlomo Thornton MD    clopidogreL (PLAVIX) 75 mg tablet Take 1 tablet (75 mg total) by mouth once daily. 21 tablet 3/25/2024 3/25/2025 Shlomo Thornton MD    ciprofloxacin HCl (CIPRO) 500 MG tablet  (Status: Discontinued) Take 1 tablet (500 mg total) by mouth 2 (two) times daily. for 10 days 20 tablet 3/25/2024 3/25/2024 Shlomo Thornton MD    nitrofurantoin, macrocrystal-monohydrate, (MACROBID) 100 MG capsule Take 1 capsule (100 mg total) by mouth 2 (two) times daily. for 5 days 10 capsule 3/25/2024 3/30/2024 Shlomo Thornton MD          Follow-up Information       Follow up With Specialties Details Why Contact Info Additional Information    Kirstin Casas MD Neurology Schedule an appointment as soon as possible for a visit   [address] [place] [id number]  [phone]       [facility] -  Emergency Medicine  As needed, If symptoms worsen [address] [place] [address] [address] [place] [address] 1st floor             Shlomo Thornton MD  03/25/24 1293

## 2024-03-25 NOTE — ED NOTES
Patient states that she feels her BP dropping, glucose 175.  Patient now CO BEE, states she feels like her head is going to burst, provider notified.

## 2024-03-25 NOTE — DISCHARGE INSTRUCTIONS
I have referred you for a outpatient stress test to be done in the next couple of days.  Somebody should be reaching out to you either today or tomorrow for this appointment information.

## 2024-03-25 NOTE — ED NOTES
Patient states that around 5 pm last night, her vision was off, states every thing seemed to be leaning.  States went to bed and slept 14 hours and woke up.  States now she is having numbness to left hand and states that she had CP.  Patient placed on cardiac monitor and continuous pulse ox.    Patient identifiers for Alexandria Tompkins checked and correct.  LOC:  Alexandria Tompkins is awake, alert, and aware of environment with an appropriate affect. She is oriented x 3 and speaking appropriately.  APPEARANCE:  She is resting comfortably and in no acute distress. She is clean and well groomed, patient's clothing is properly fastened.  SKIN:  The skin is warm and dry. She has normal skin turgor and moist mucus membranes. Skin is intact; no bruising or breakdown noted.  MUSCULOSKELETAL:  She is moving all extremities well, no obvious deformities noted. Pulses intact.   RESPIRATORY:  Airway is open and patent. Respirations are spontaneous and non-labored with normal effort and rate.  CARDIAC:  She has a normal rate and rhythm. No peripheral edema noted. Capillary refill < 3 seconds.  CP  ABDOMEN:  No distention noted.  Soft and non-tender upon palpation.  NEUROLOGICAL:  PERRL. Facial expression is symmetrical. Hand grasps are equal bilaterally. Normal sensation in all extremities when touched with finger. Numbness to left hand  Allergies reported:    Review of patient's allergies indicates:   Allergen Reactions    Sulfa (sulfonamide antibiotics)

## 2024-03-27 ENCOUNTER — TELEPHONE (OUTPATIENT)
Dept: CARDIOLOGY | Facility: HOSPITAL | Age: 64
End: 2024-03-27

## 2024-03-27 LAB — BACTERIA UR CULT: ABNORMAL

## 2024-04-04 ENCOUNTER — OFFICE VISIT (OUTPATIENT)
Dept: URGENT CARE | Facility: CLINIC | Age: 64
End: 2024-04-04
Payer: COMMERCIAL

## 2024-04-04 ENCOUNTER — HOSPITAL ENCOUNTER (EMERGENCY)
Facility: HOSPITAL | Age: 64
Discharge: HOME | End: 2024-04-04
Attending: EMERGENCY MEDICINE
Payer: COMMERCIAL

## 2024-04-04 VITALS
TEMPERATURE: 97 F | DIASTOLIC BLOOD PRESSURE: 70 MMHG | HEART RATE: 75 BPM | WEIGHT: 134 LBS | BODY MASS INDEX: 24.66 KG/M2 | RESPIRATION RATE: 18 BRPM | SYSTOLIC BLOOD PRESSURE: 122 MMHG | HEIGHT: 62 IN | OXYGEN SATURATION: 97 %

## 2024-04-04 VITALS
SYSTOLIC BLOOD PRESSURE: 112 MMHG | DIASTOLIC BLOOD PRESSURE: 58 MMHG | BODY MASS INDEX: 24.52 KG/M2 | HEART RATE: 82 BPM | WEIGHT: 134.06 LBS | TEMPERATURE: 98 F | OXYGEN SATURATION: 97 % | RESPIRATION RATE: 17 BRPM

## 2024-04-04 DIAGNOSIS — S82.841A CLOSED BIMALLEOLAR FRACTURE OF RIGHT ANKLE, INITIAL ENCOUNTER: Primary | ICD-10-CM

## 2024-04-04 DIAGNOSIS — S82.841A CLOSED BIMALLEOLAR FRACTURE OF RIGHT ANKLE, INITIAL ENCOUNTER: ICD-10-CM

## 2024-04-04 DIAGNOSIS — M25.571 ACUTE RIGHT ANKLE PAIN: Primary | ICD-10-CM

## 2024-04-04 DIAGNOSIS — S40.011A CONTUSION OF RIGHT SHOULDER, INITIAL ENCOUNTER: ICD-10-CM

## 2024-04-04 PROCEDURE — 29515 APPLICATION SHORT LEG SPLINT: CPT | Mod: RT

## 2024-04-04 PROCEDURE — 96372 THER/PROPH/DIAG INJ SC/IM: CPT | Performed by: EMERGENCY MEDICINE

## 2024-04-04 PROCEDURE — 99284 EMERGENCY DEPT VISIT MOD MDM: CPT | Mod: 25

## 2024-04-04 PROCEDURE — 99214 OFFICE O/P EST MOD 30 MIN: CPT | Mod: S$GLB,,, | Performed by: NURSE PRACTITIONER

## 2024-04-04 PROCEDURE — 63600175 PHARM REV CODE 636 W HCPCS: Performed by: EMERGENCY MEDICINE

## 2024-04-04 RX ORDER — PROMETHAZINE HYDROCHLORIDE 25 MG/1
1 TABLET ORAL EVERY 6 HOURS PRN
COMMUNITY

## 2024-04-04 RX ORDER — OXYCODONE AND ACETAMINOPHEN 10; 325 MG/1; MG/1
1 TABLET ORAL EVERY 4 HOURS PRN
Qty: 15 TABLET | Refills: 0 | Status: SHIPPED | OUTPATIENT
Start: 2024-04-04

## 2024-04-04 RX ORDER — BLOOD SUGAR DIAGNOSTIC
STRIP MISCELLANEOUS
COMMUNITY
Start: 2023-12-26

## 2024-04-04 RX ORDER — INSULIN LISPRO 100 [IU]/ML
INJECTION, SOLUTION INTRAVENOUS; SUBCUTANEOUS
COMMUNITY

## 2024-04-04 RX ORDER — PEN NEEDLE, DIABETIC 30 GX3/16"
NEEDLE, DISPOSABLE MISCELLANEOUS
COMMUNITY
Start: 2024-01-19

## 2024-04-04 RX ORDER — PANCRELIPASE 36000; 180000; 114000 [USP'U]/1; [USP'U]/1; [USP'U]/1
CAPSULE, DELAYED RELEASE PELLETS ORAL
COMMUNITY

## 2024-04-04 RX ORDER — MORPHINE SULFATE 2 MG/ML
6 INJECTION, SOLUTION INTRAMUSCULAR; INTRAVENOUS
Status: COMPLETED | OUTPATIENT
Start: 2024-04-04 | End: 2024-04-04

## 2024-04-04 RX ORDER — INSULIN ASPART 100 [IU]/ML
8 INJECTION, SOLUTION INTRAVENOUS; SUBCUTANEOUS
COMMUNITY
Start: 2024-01-19

## 2024-04-04 RX ORDER — PANCRELIPASE LIPASE, PANCRELIPASE PROTEASE, PANCRELIPASE AMYLASE 25000; 79000; 105000 [USP'U]/1; [USP'U]/1; [USP'U]/1
1 CAPSULE, DELAYED RELEASE ORAL 3 TIMES DAILY
COMMUNITY

## 2024-04-04 RX ORDER — ALBUTEROL SULFATE 90 UG/1
2 AEROSOL, METERED RESPIRATORY (INHALATION)
COMMUNITY

## 2024-04-04 RX ORDER — MULTIVITAMIN
1 TABLET ORAL DAILY
COMMUNITY

## 2024-04-04 RX ORDER — CIDER VINEGAR 300 MG
TABLET ORAL
COMMUNITY

## 2024-04-04 RX ADMIN — MORPHINE SULFATE 6 MG: 2 INJECTION, SOLUTION INTRAMUSCULAR; INTRAVENOUS at 04:04

## 2024-04-04 NOTE — PROGRESS NOTES
"Subjective:      Patient ID: Alexandria Tompkins is a 64 y.o. female.    Vitals:  height is 5' 2" (1.575 m) and weight is 60.8 kg (134 lb). Her oral temperature is 97.3 °F (36.3 °C). Her blood pressure is 122/70 and her pulse is 75. Her respiration is 18 and oxygen saturation is 97%.     Chief Complaint: Foot Injury    64 y.o. female who presents s/p fall just PTA. She explains that her floor mat slipped out from underneath her and she injured her right ankle. She also reports bruise to right shoulder. She explains that her right shoulder struck some bricks as she was falling. She denies any shoulder pain. She is noted to have a subconjunctival hemorrhage of the left eye. She explains that this is not new and related to Plavix which she d/c'd a week or so ago. Patient denies striking her head. She denies any LOC. She denies any neck pain. She denies any other injuries or complaints other than previously stated.      Foot Injury   The incident occurred less than 1 hour ago. The incident occurred at home. The injury mechanism was a twisting injury. The pain is present in the right foot. The pain is at a severity of 7/10. The pain is moderate. The pain has been Constant since onset. Associated symptoms include an inability to bear weight, numbness and tingling. She reports no foreign bodies present. She has tried acetaminophen for the symptoms. The treatment provided no relief.       Musculoskeletal:  Positive for pain and joint swelling.   Skin:  Positive for bruising.   Neurological:  Positive for numbness.      Objective:     Physical Exam   Constitutional: She is oriented to person, place, and time. normal  HENT:   Head: Normocephalic and atraumatic.   Eyes: Conjunctivae are normal. Extraocular movement intact   Neck: Neck supple.   Cardiovascular: Normal rate, regular rhythm, normal heart sounds and normal pulses.   Pulmonary/Chest: Effort normal and breath sounds normal.   Abdominal: Normal appearance. "   Musculoskeletal: Normal range of motion.         General: Normal range of motion.      Cervical back: She exhibits no tenderness.      Comments: Mild swelling bruising to lateral aspect of right ankle. She has some pain with flexion/extension of the right foot. However, she has good TNV of the right lower extremity. She is noted to have some mild bruising to anterior right shoulder. Otherwise, normal shoulder exam.   Neurological: She is alert and oriented to person, place, and time.   Skin: Skin is warm and dry. bruising   Psychiatric: Her behavior is normal.   Vitals reviewed.    XR SHOULDER COMPLETE 2 OR MORE VIEWS RIGHT    Result Date: 4/4/2024  EXAMINATION: XR SHOULDER COMPLETE 2 OR MORE VIEWS RIGHT CLINICAL HISTORY: Contusion of right shoulder, initial encounter TECHNIQUE: Two or three views of the right shoulder were performed. COMPARISON: None FINDINGS: There is no fracture or dislocation at the acromioclavicular or glenohumeral joint.  There are minor degenerative changes at the AC joint.  Visualized right-sided ribs are intact.  Calcified granuloma again noted right mid lung, unchanged compared to previous chest x-ray     No acute abnormality. Electronically signed by: Eva De Luna Date:    04/04/2024 Time:    12:36    XR ANKLE COMPLETE 3 VIEW RIGHT    Result Date: 4/4/2024  EXAMINATION: jXR ANKLE COMPLETE 3 VIEW RIGHT CLINICAL HISTORY: Pain in right ankle and joints of right foot TECHNIQUE: AP, lateral, and oblique images of the right ankle were performed. COMPARISON: None FINDINGS: There is a subtle nondisplaced comminuted oblique fracture of the lateral malleolus.  There is a nondisplaced transverse fracture of the base of the medial malleolus.  Subtle nondisplaced cortical fracture medial aspect of the distal tibial metadiaphysis.  There is associated soft tissue swelling.  Tibial articulation intact.     Nondisplaced bimalleolar fracture with associated soft tissue swelling. Nondisplaced  cortical fracture distal tibial metadiaphysis. This report was flagged in Epic as abnormal. Electronically signed by: Prasanna Caban Date:    04/04/2024 Time:    12:25      ORTHO GLASS SPLINT  Short leg orthoglass splint placed to right lower extremity. Patient had good TNV before and after splint application. Patient was given splint care instructions.    Assessment:     1. Acute right ankle pain    2. Contusion of right shoulder, initial encounter    3. Closed bimalleolar fracture of right ankle, initial encounter        Plan:       Acute right ankle pain  -     XR ANKLE COMPLETE 3 VIEW RIGHT; Future; Expected date: 04/04/2024    Contusion of right shoulder, initial encounter  -     XR SHOULDER COMPLETE 2 OR MORE VIEWS RIGHT; Future; Expected date: 04/04/2024    Closed bimalleolar fracture of right ankle, initial encounter      INSTRUCTIONS  To ER now for further evaluation and treatment (Orthopedist consult) of Bimalleolar fracture of right lower extremity. Remain non-weight bearing on right lower extremity until instructed otherwise by orthopedist.

## 2024-04-04 NOTE — ED PROVIDER NOTES
"Encounter Date: 4/4/2024       History     Chief Complaint   Patient presents with    Ankle Injury     Patient sent here for orthopedic surgery due to "unstable ankle fractures".      Chief complaint:  Ankle fracture    HPI:  64-year-old female presents with right ankle pain and swelling after she slipped and fell at home.  She was seen at an urgent care and diagnosed with a bimalleolar fracture.  She is unable to bear weight on this ankle.  Other x-rays are normal.  She has no headache, neck pain or back pain.  She has no weakness or numbness.  Past medical history is significant for type 1 diabetes, Sjogren's disease, rheumatoid arthritis, hypothyroidism and pancreatic cancer status post Whipple procedure.      Review of patient's allergies indicates:   Allergen Reactions    Acetaminophen Other (See Comments)     Pt has had a whipple surgery- and is unable to take any products with tylenol    Codeine     Hydrocodone-acetaminophen Other (See Comments)     "makes me delirious'    Pt had lengthy conversation with Dr Reyes about meds she can and cannot take    Sulfa (sulfonamide antibiotics)      Past Medical History:   Diagnosis Date    Cancer     Hypothyroidism, unspecified     Rheumatoid arthritis, unspecified     Sjogren's disease     Type 1 diabetes mellitus      Past Surgical History:   Procedure Laterality Date    PANCREAS SURGERY       No family history on file.  Social History     Tobacco Use    Smoking status: Never    Smokeless tobacco: Never     Review of Systems   Constitutional:  Negative for fever.   Respiratory:  Negative for shortness of breath.    Genitourinary:  Negative for flank pain.   Musculoskeletal:  Positive for arthralgias and joint swelling. Negative for gait problem.   Neurological:  Negative for weakness.   Psychiatric/Behavioral:  Negative for confusion.        Physical Exam     Initial Vitals [04/04/24 1546]   BP Pulse Resp Temp SpO2   (!) 112/58 82 16 97.8 °F (36.6 °C) 97 %      MAP    "    --         Physical Exam    Constitutional: Vital signs are normal. She is not diaphoretic.  Non-toxic appearance. She does not have a sickly appearance. No distress.   HENT:   Head: Normocephalic and atraumatic.   Eyes: Conjunctivae are normal.   Neck: Phonation normal. Neck supple. No thyromegaly present. No tracheal deviation present.   Cardiovascular:  Normal rate.           Pulmonary/Chest: No respiratory distress.   Abdominal: She exhibits no distension.   Musculoskeletal:         General: Tenderness (tenderness and swelling of the medial and lateral right ankle with decreased range of motion) present.      Cervical back: Neck supple.     Neurological: She is alert and oriented to person, place, and time.   Skin: Skin is warm, dry and intact. No pallor.   Psychiatric: She has a normal mood and affect. Her speech is normal and behavior is normal. Thought content normal.         ED Course   Orthopedic Injury    Date/Time: 4/4/2024 3:31 PM    Performed by: Severiano Wilkinson III, MD  Authorized by: Severiano Wilkinson III, MD    Location procedure was performed:  [facility] EMERGENCY DEPARTMENT  Injury:     Injury location:  Ankle    Location details:  Right ankle    Injury type:  Fracture    Fracture type: bimalleolar        Pre-procedure assessment:     Neurovascular status: Neurovascularly intact      Distal perfusion: normal      Neurological function: normal      Range of motion: reduced        Selections made in this section will also lock the Injury type section above.:     Manipulation performed?: No      Immobilization:  Splint (Splint applied by RN)    Splint type:  Ankle stirrup    Supplies used:  Ortho-Glass  Post-procedure assessment:     Neurovascular status: Neurovascularly intact      Distal perfusion: normal      Neurological function: normal      Range of motion: splinted      Patient tolerance:  Patient tolerated the procedure well with no immediate complications    Labs Reviewed - No data to  display       Imaging Results    None          Medications   morphine injection 6 mg (6 mg Intramuscular Given 4/4/24 5369)     Medical Decision Making  64-year-old female is referred to the emergency department with a nondisplaced bimalleolar fracture.  She is placed in a stirrup splint and provided crutches.  She is to follow up with Orthopedic surgery further management of her fracture.    Risk  Prescription drug management.                                      Clinical Impression:  Final diagnoses:  [V66.288K] Closed bimalleolar fracture of right ankle, initial encounter (Primary)          ED Disposition Condition    Discharge Stable          ED Prescriptions       Medication Sig Dispense Start Date End Date Auth. Provider    oxyCODONE-acetaminophen (PERCOCET)  mg per tablet Take 1 tablet by mouth every 4 (four) hours as needed for Pain. 15 tablet 4/4/2024 -- Severiano Wilkinson III, MD          Follow-up Information       Follow up With Specialties Details Why Contact Info    Martinez Chavira MD Orthopedic Surgery In 4 days  [address]  [address]  [place] [address]  [phone]               Severiano Wilkinson III, MD  04/04/24 8717       Severiano Wilkinson III, MD  04/04/24 9167

## 2024-04-08 ENCOUNTER — OFFICE VISIT (OUTPATIENT)
Dept: ORTHOPEDICS | Facility: CLINIC | Age: 64
End: 2024-04-08
Payer: COMMERCIAL

## 2024-04-08 VITALS
HEIGHT: 62 IN | BODY MASS INDEX: 24.67 KG/M2 | OXYGEN SATURATION: 100 % | WEIGHT: 134.06 LBS | HEART RATE: 90 BPM | RESPIRATION RATE: 18 BRPM

## 2024-04-08 DIAGNOSIS — S82.891A CLOSED FRACTURE OF RIGHT ANKLE, INITIAL ENCOUNTER: Primary | ICD-10-CM

## 2024-04-08 PROCEDURE — 1159F MED LIST DOCD IN RCRD: CPT | Mod: S$GLB,,, | Performed by: ORTHOPAEDIC SURGERY

## 2024-04-08 PROCEDURE — 3008F BODY MASS INDEX DOCD: CPT | Mod: S$GLB,,, | Performed by: ORTHOPAEDIC SURGERY

## 2024-04-08 PROCEDURE — 1160F RVW MEDS BY RX/DR IN RCRD: CPT | Mod: S$GLB,,, | Performed by: ORTHOPAEDIC SURGERY

## 2024-04-08 PROCEDURE — 99205 OFFICE O/P NEW HI 60 MIN: CPT | Mod: S$GLB,,, | Performed by: ORTHOPAEDIC SURGERY

## 2024-04-08 PROCEDURE — 99999 PR PBB SHADOW E&M-EST. PATIENT-LVL IV: CPT | Mod: PBBFAC,,, | Performed by: ORTHOPAEDIC SURGERY

## 2024-04-08 RX ORDER — TRAMADOL HYDROCHLORIDE 50 MG/1
50 TABLET ORAL EVERY 6 HOURS PRN
Qty: 10 TABLET | Refills: 0 | Status: SHIPPED | OUTPATIENT
Start: 2024-04-08 | End: 2024-04-09 | Stop reason: SDUPTHER

## 2024-04-08 RX ORDER — PROMETHAZINE HYDROCHLORIDE 25 MG/1
25 TABLET ORAL EVERY 8 HOURS PRN
Qty: 10 TABLET | Refills: 0 | Status: SHIPPED | OUTPATIENT
Start: 2024-04-08

## 2024-04-08 NOTE — PROGRESS NOTES
Subjective:      Patient ID: Alexandria Tompkins is a 64 y.o. female.    Chief Complaint: Injury of the Right Ankle (Ankle fx)    HPI  64-year-old female with a five-day history of right ankle pain status post an accidental twisting injury.  Was seen in the emergency department placed into a splint and referred for further evaluation.  Pain has improved with relative rest and narcotics denies any other complaints or prior problems with the right foot or ankle.  ROS      Objective:    Ortho Exam     Constitutional:   Patient is alert  and oriented in no acute distress  HEENT:  normocephalic atraumatic; PERRL EOMI  Neck:  Supple without adenopathy  Cardiovascular:  Normal rate and rhythm  Pulmonary:  Normal respiratory effort normal chest wall expansion  Abdominal:  Nonprotuberant nondistended  Musculoskeletal:  Mild swelling of the right ankle with diffuse lateral tenderness  Limited range of motion secondary to pain.  She has intact skin, sensation, and brisk capillary refill of digits  Neurological:  No focal defect; cranial nerves 2-12 grossly intact  Psychiatric/behavioral:  Mood and behavior normal      XR SHOULDER COMPLETE 2 OR MORE VIEWS RIGHT  Narrative: EXAMINATION:  XR SHOULDER COMPLETE 2 OR MORE VIEWS RIGHT    CLINICAL HISTORY:  Contusion of right shoulder, initial encounter    TECHNIQUE:  Two or three views of the right shoulder were performed.    COMPARISON:  None    FINDINGS:  There is no fracture or dislocation at the acromioclavicular or glenohumeral joint.  There are minor degenerative changes at the AC joint.  Visualized right-sided ribs are intact.  Calcified granuloma again noted right mid lung, unchanged compared to previous chest x-ray  Impression: No acute abnormality.    Electronically signed by: Eva De Luna  Date:    04/04/2024  Time:    12:36  XR ANKLE COMPLETE 3 VIEW RIGHT  Narrative: EXAMINATION:  jXR ANKLE COMPLETE 3 VIEW RIGHT    CLINICAL HISTORY:  Pain in right ankle and joints of  right foot    TECHNIQUE:  AP, lateral, and oblique images of the right ankle were performed.    COMPARISON:  None    FINDINGS:  There is a subtle nondisplaced comminuted oblique fracture of the lateral malleolus.  There is a nondisplaced transverse fracture of the base of the medial malleolus.  Subtle nondisplaced cortical fracture medial aspect of the distal tibial metadiaphysis.  There is associated soft tissue swelling.  Tibial articulation intact.  Impression: Nondisplaced bimalleolar fracture with associated soft tissue swelling.    Nondisplaced cortical fracture distal tibial metadiaphysis.    This report was flagged in Epic as abnormal.    Electronically signed by: Prasanna Caban  Date:    04/04/2024  Time:    12:25       My Radiographs Findings:    I have personally reviewed radiographs and concur with above findings    Assessment:       Encounter Diagnosis   Name Primary?    Closed fracture of right ankle, initial encounter Yes         Plan:       I have discussed medical condition treatment options with the family at length including strict nonweightbearing ice elevation compressive wrapping and a Cam boot.  We will give her a few tramadol in her request some Phenergan to go along with that.  I have discussed transitioning from prescription pain medicines to over-the-counter NSAIDs with the next several days as I would be unable to continue to prescribe narcotics for her medical condition.  Apparently due to some ongoing medical issues including a fairly recent Whipple she is required intermittent use of chronic narcotics but we would need to get her plugged into the pain management or to her PCP for any ongoing narcotic use.  Follow up radiographs in approximately 2-3 weeks I will see her sooner if any questions or problems.        Past Medical History:   Diagnosis Date    Cancer     Hypothyroidism, unspecified     Rheumatoid arthritis, unspecified     Sjogren's disease     Type 1 diabetes mellitus   "    Past Surgical History:   Procedure Laterality Date    PANCREAS SURGERY           Current Outpatient Medications:     ACCU-CHEK GUIDE TEST STRIPS Strp, USE AS DIRECTED UP TO THREE TIMES DAILY AS NEEDED, Disp: , Rfl:     albuterol (PROVENTIL/VENTOLIN HFA) 90 mcg/actuation inhaler, 2 puffs., Disp: , Rfl:     apple cider vinegar 300 mg Tab, Take by mouth., Disp: , Rfl:     aspirin (ECOTRIN) 81 MG EC tablet, Take 1 tablet (81 mg total) by mouth once daily., Disp: 30 tablet, Rfl: 0    insulin aspart U-100 (NOVOLOG) 100 unit/mL (3 mL) InPn pen, Inject 8 Units into the skin., Disp: , Rfl:     insulin degludec (TRESIBA FLEXTOUCH U-100) 100 unit/mL (3 mL) insulin pen, Inject 40 units every day by subcutaneous route for 90 days., Disp: , Rfl:     insulin lispro (HUMALOG KWIKPEN INSULIN) 100 unit/mL pen, INJECT EIGHT units by subcutaneous route THREE TIMES DAILY AS NEEDED glucose greater THAN 200, Disp: , Rfl:     levothyroxine (SYNTHROID) 200 MCG tablet, Take 1 tablet by mouth once daily., Disp: , Rfl:     lipase-protease-amylase (CREON) 36,000-114,000- 180,000 unit CpDR, TAKE 1 CAPSULE BY MOUTH THREE TIMES A DAY WITH MEALS, Disp: , Rfl:     multivitamin with folic acid 400 mcg Tab, Take 1 tablet by mouth once daily., Disp: , Rfl:     oxyCODONE-acetaminophen (PERCOCET)  mg per tablet, Take 1 tablet by mouth every 4 (four) hours as needed for Pain., Disp: 15 tablet, Rfl: 0    pen needle, diabetic 32 gauge x 5/32" Ndle, Daily as directed with insulin pen, Disp: , Rfl:     promethazine (PHENERGAN) 25 MG tablet, Take 1 tablet by mouth every 6 (six) hours as needed., Disp: , Rfl:     ursodioL (ACTIGALL) 250 mg Tab, Take 1 tablet by mouth 4 (four) times daily., Disp: , Rfl:     ZENPEP 25,000-79,000- 105,000 unit CpDR, Take 1 capsule by mouth 3 (three) times daily., Disp: , Rfl:     promethazine (PHENERGAN) 25 MG tablet, Take 1 tablet (25 mg total) by mouth every 8 (eight) hours as needed for Nausea., Disp: 10 tablet, Rfl: " "0    traMADoL (ULTRAM) 50 mg tablet, Take 1 tablet (50 mg total) by mouth every 6 (six) hours as needed for Pain., Disp: 10 tablet, Rfl: 0    Review of patient's allergies indicates:   Allergen Reactions    Acetaminophen Other (See Comments)     Pt has had a whipple surgery- and is unable to take any products with tylenol    Codeine     Hydrocodone-acetaminophen Other (See Comments)     "makes me delirious'    Pt had lengthy conversation with Dr Reyes about meds she can and cannot take    Sulfa (sulfonamide antibiotics)        History reviewed. No pertinent family history.  Social History     Occupational History    Not on file   Tobacco Use    Smoking status: Never     Passive exposure: Never    Smokeless tobacco: Never   Substance and Sexual Activity    Alcohol use: Not Currently    Drug use: Never    Sexual activity: Yes     Partners: Male     Birth control/protection: None       "

## 2024-04-09 DIAGNOSIS — S82.891A CLOSED FRACTURE OF RIGHT ANKLE, INITIAL ENCOUNTER: ICD-10-CM

## 2024-04-09 RX ORDER — TRAMADOL HYDROCHLORIDE 50 MG/1
50 TABLET ORAL EVERY 6 HOURS PRN
Qty: 10 TABLET | Refills: 0 | Status: SHIPPED | OUTPATIENT
Start: 2024-04-09

## 2024-04-09 NOTE — TELEPHONE ENCOUNTER
----- Message from Virgilio Mason sent at 4/9/2024 10:09 AM CDT -----  Regarding: medication  Contact: patient  Type:  Patient Returning Call    Who Called:patient  Who Left Message for Patient:office nurse  Does the patient know what this is regarding?:traMADoL (ULTRAM) 50 mg tablet/ pharmacy is telling patient office needs to send prior authorization before they will release medication  Would the patient rather a call back or a response via [facility]? Please call patient  Best Call Back Number:[phone]  Additional Information:

## 2024-04-12 ENCOUNTER — TELEPHONE (OUTPATIENT)
Dept: ORTHOPEDICS | Facility: CLINIC | Age: 64
End: 2024-04-12
Payer: COMMERCIAL

## 2024-04-12 DIAGNOSIS — S82.891A CLOSED FRACTURE OF RIGHT ANKLE, INITIAL ENCOUNTER: Primary | ICD-10-CM

## 2024-04-12 NOTE — TELEPHONE ENCOUNTER
Spoke with pt regarding scheduling her follow up appointment. Pt began talking very fast and rambling. Pt stated she does not remember much of the previous appointment because she was on Perocets and she still does not under stand how to use the boot given to her at the appointment. I informed her an instruction booklet was given to her at the appointment and that her  came by and I gave him printed instructions as well on how to use the boot. Pt stated she has been taking the pain medication Dr. Chavira prescribed in quarters rather than how it is prescribed. Pt stated her  takes care of her medications for her as he is very knowledgeable in pharmacy. Pt stated her  came to [facility] today to ask Dr. Chavira if he can write more of the pain medication to get her through the weekend. I advised I will reach out to Dr. Chavira however it is unlikely he will do this. Pt verbalized understanding and began telling me about a special sock she has to wear with the boot and asked where does she get that. I advised any sock will be fine.     Pt is requesting more Tramadol 50 mg. Sent to Dr. Chavira for further advisement.

## 2024-04-23 ENCOUNTER — TELEPHONE (OUTPATIENT)
Dept: ORTHOPEDICS | Facility: CLINIC | Age: 64
End: 2024-04-23
Payer: COMMERCIAL

## 2024-04-23 NOTE — TELEPHONE ENCOUNTER
Called to confirm Thursday appointments , pt decided to cancel. Assisted in cancelling appointments

## 2024-12-18 ENCOUNTER — OFFICE VISIT (OUTPATIENT)
Dept: URGENT CARE | Facility: CLINIC | Age: 64
End: 2024-12-18
Payer: COMMERCIAL

## 2024-12-18 VITALS
RESPIRATION RATE: 21 BRPM | BODY MASS INDEX: 24.11 KG/M2 | DIASTOLIC BLOOD PRESSURE: 69 MMHG | WEIGHT: 131 LBS | TEMPERATURE: 100 F | OXYGEN SATURATION: 99 % | SYSTOLIC BLOOD PRESSURE: 128 MMHG | HEART RATE: 106 BPM | HEIGHT: 62 IN

## 2024-12-18 DIAGNOSIS — R05.9 COUGH, UNSPECIFIED TYPE: ICD-10-CM

## 2024-12-18 DIAGNOSIS — J06.9 UPPER RESPIRATORY TRACT INFECTION, UNSPECIFIED TYPE: Primary | ICD-10-CM

## 2024-12-18 LAB
CTP QC/QA: YES
SARS-COV-2 AG RESP QL IA.RAPID: NEGATIVE

## 2024-12-18 PROCEDURE — 87811 SARS-COV-2 COVID19 W/OPTIC: CPT | Mod: QW,S$GLB,, | Performed by: NURSE PRACTITIONER

## 2024-12-18 PROCEDURE — 99213 OFFICE O/P EST LOW 20 MIN: CPT | Mod: S$GLB,,, | Performed by: NURSE PRACTITIONER

## 2024-12-18 RX ORDER — MOMETASONE FUROATE 1 MG/G
CREAM TOPICAL
COMMUNITY
Start: 2024-10-30

## 2024-12-18 RX ORDER — CHOLECALCIFEROL (VITAMIN D3) 1250 MCG
1 TABLET ORAL
COMMUNITY
Start: 2024-10-31

## 2024-12-18 RX ORDER — ESTRADIOL 1 MG/1
1 TABLET ORAL EVERY MORNING
COMMUNITY
Start: 2024-10-30

## 2024-12-18 RX ORDER — FLUTICASONE PROPIONATE AND SALMETEROL 250; 50 UG/1; UG/1
1 POWDER RESPIRATORY (INHALATION) 2 TIMES DAILY
COMMUNITY

## 2024-12-18 RX ORDER — TAMSULOSIN HYDROCHLORIDE 0.4 MG/1
1 CAPSULE ORAL EVERY MORNING
COMMUNITY
Start: 2024-10-07

## 2024-12-18 RX ORDER — PROMETHAZINE HYDROCHLORIDE AND DEXTROMETHORPHAN HYDROBROMIDE 6.25; 15 MG/5ML; MG/5ML
5 SYRUP ORAL EVERY 6 HOURS PRN
Qty: 120 ML | Refills: 0 | Status: SHIPPED | OUTPATIENT
Start: 2024-12-18 | End: 2024-12-28

## 2024-12-18 RX ORDER — OMEGA-3-ACID ETHYL ESTERS 1 G/1
1 CAPSULE, LIQUID FILLED ORAL
COMMUNITY

## 2024-12-18 RX ORDER — INSULIN ASPART INJECTION 100 [IU]/ML
12 INJECTION, SOLUTION SUBCUTANEOUS
COMMUNITY
Start: 2024-01-19

## 2024-12-18 RX ORDER — BLOOD-GLUCOSE SENSOR
EACH MISCELLANEOUS
COMMUNITY
Start: 2024-11-26

## 2024-12-18 RX ORDER — AZITHROMYCIN 250 MG/1
TABLET, FILM COATED ORAL
Qty: 6 TABLET | Refills: 0 | Status: SHIPPED | OUTPATIENT
Start: 2024-12-18

## 2024-12-18 RX ORDER — CYCLOSPORINE 0.5 MG/ML
1 EMULSION OPHTHALMIC
COMMUNITY

## 2024-12-18 NOTE — PROGRESS NOTES
"Subjective:      Patient ID: Alexandria Tompkins is a 64 y.o. female.    Vitals:  height is 5' 2" (1.575 m) and weight is 59.4 kg (131 lb). Her oral temperature is 99.6 °F (37.6 °C). Her blood pressure is 128/69 and her pulse is 106. Her respiration is 21 (abnormal) and oxygen saturation is 99%.     Chief Complaint: Cough (Symptoms started on today. Symptoms are the following: cough, sob, chills, sweats, fever, hx latent diabetic, nasal congestion, weakness, sore throat. Treating with wixela inhaler)    This is a 64 y.o. female who presents today with a chief complaint of Cough: Symptoms started on today. Symptoms are the following: cough, episodes of SOB today, chills, sweats, fever, hx latent diabetic, nasal congestion, weakness, sore throat. Treating with wixela inhaler which improved symptoms today.       Patient presents with:  Cough: Symptoms started on today. Symptoms are the following: cough, sob, chills, sweats, fever, hx latent diabetic, nasal congestion, weakness, sore throat. Treating with wixela inhaler         Cough  This is a new problem. The current episode started today. The problem has been gradually worsening. The problem occurs constantly. The cough is Productive of sputum. Associated symptoms include chills, a fever, shortness of breath, sweats and wheezing. Associated symptoms comments: WEAKNESS. Treatments tried: WIXELA INHALER. The treatment provided mild relief. Her past medical history is significant for pneumonia.       Constitution: Positive for chills and fever.   HENT:  Positive for congestion.    Respiratory:  Positive for cough, shortness of breath and wheezing.       Objective:     Physical Exam   Constitutional: She is oriented to person, place, and time. She appears well-developed. She is cooperative.  Non-toxic appearance. She does not appear ill. No distress.   HENT:   Head: Normocephalic and atraumatic.   Ears:   Right Ear: Hearing, tympanic membrane, external ear and ear canal normal. "   Left Ear: Hearing, tympanic membrane, external ear and ear canal normal.   Nose: No mucosal edema, rhinorrhea or nasal deformity. No epistaxis. Right sinus exhibits maxillary sinus tenderness. Right sinus exhibits no frontal sinus tenderness. Left sinus exhibits maxillary sinus tenderness. Left sinus exhibits no frontal sinus tenderness.   Mouth/Throat: Uvula is midline, oropharynx is clear and moist and mucous membranes are normal. No trismus in the jaw. Normal dentition. No uvula swelling. No oropharyngeal exudate, posterior oropharyngeal edema or posterior oropharyngeal erythema.   Eyes: Conjunctivae and lids are normal. No scleral icterus.   Neck: Trachea normal and phonation normal. Neck supple. No edema present. No erythema present. No neck rigidity present.   Cardiovascular: Normal rate, regular rhythm, normal heart sounds and normal pulses.   Pulmonary/Chest: Effort normal and breath sounds normal. No respiratory distress. She has no decreased breath sounds. She has no wheezes. She has no rhonchi. She has no rales.   Abdominal: Normal appearance.   Musculoskeletal: Normal range of motion.         General: No deformity. Normal range of motion.   Neurological: She is alert and oriented to person, place, and time. She exhibits normal muscle tone. Coordination normal.   Skin: Skin is warm, dry, intact, not diaphoretic and not pale.   Psychiatric: Her speech is normal and behavior is normal. Judgment and thought content normal.   Nursing note and vitals reviewed.    Results for orders placed or performed in visit on 12/18/24   SARS Coronavirus 2 Antigen, POCT Manual Read    Collection Time: 12/18/24  2:58 PM   Result Value Ref Range    SARS Coronavirus 2 Antigen Negative Negative     Acceptable Yes     XR CHEST PA AND LATERAL  Narrative: EXAMINATION:  XR CHEST PA AND LATERAL    CLINICAL HISTORY:  Cough, unspecified    TECHNIQUE:  PA and lateral views of the chest were  performed.    COMPARISON:  03/25/2024    FINDINGS:  Cardiomediastinal silhouette within normal limits.  Lungs clear aside from calcified granulomata.  No pleural effusion or acute bony abnormality.  Thoracic dextroscoliosis is present.  Impression: No acute cardiopulmonary disease.    Electronically signed by: Eva De Luna  Date:    12/18/2024  Time:    15:15     Assessment:     1. Upper respiratory tract infection, unspecified type    2. Cough, unspecified type        Plan:       Upper respiratory tract infection, unspecified type  -     azithromycin (Z-KORIN) 250 MG tablet; Take 2 tablets by mouth on day 1; Then Take 1 tablet by mouth on days 2-5  Dispense: 6 tablet; Refill: 0    Cough, unspecified type  -     SARS Coronavirus 2 Antigen, POCT Manual Read  -     XR CHEST PA AND LATERAL; Future; Expected date: 12/18/2024  -     promethazine-dextromethorphan (PROMETHAZINE-DM) 6.25-15 mg/5 mL Syrp; Take 5 mLs by mouth every 6 (six) hours as needed (cough).  Dispense: 120 mL; Refill: 0          Medical Decision Making:   Urgent Care Management:  I have provided pt with z pack per her request as she states she normally gets recurrent URI and needs z pack to resolve. I discussed the possibility of symptoms being related to viral illness. I recommended waiting to start antibiotic if no improvement in symptoms over the next 3-5 days.        Patient Instructions   Please return here or go to the Emergency Department for any concerns or worsening of condition.  Please drink plenty of fluids.  Please get plenty of rest.  If you were prescribed antibiotics, please take them to completion.  Recommend Mucinex OTC as directed.   If you do have Hypertension or palpitations, it is safe to take Coricidin HBP for relief of sinus symptoms.  If not allergic, please take over the counter Tylenol (Acetaminophen) and/or Motrin (Ibuprofen) as directed for control of pain and/or fever.  Please follow up with your primary care doctor  or specialist as needed.    If you  smoke, please stop smoking.          Nikita Yoder, KAY-C

## 2025-04-07 ENCOUNTER — HOSPITAL ENCOUNTER (EMERGENCY)
Facility: HOSPITAL | Age: 65
Discharge: HOME | End: 2025-04-07
Attending: EMERGENCY MEDICINE
Payer: MEDICARE

## 2025-04-07 VITALS
SYSTOLIC BLOOD PRESSURE: 102 MMHG | TEMPERATURE: 99 F | OXYGEN SATURATION: 96 % | BODY MASS INDEX: 23.92 KG/M2 | HEIGHT: 62 IN | DIASTOLIC BLOOD PRESSURE: 51 MMHG | WEIGHT: 130 LBS | HEART RATE: 74 BPM | RESPIRATION RATE: 20 BRPM

## 2025-04-07 DIAGNOSIS — R11.0 NAUSEA: ICD-10-CM

## 2025-04-07 DIAGNOSIS — K11.20 PAROTID SIALOADENITIS: Primary | ICD-10-CM

## 2025-04-07 DIAGNOSIS — J40 BRONCHITIS: ICD-10-CM

## 2025-04-07 DIAGNOSIS — R05.9 COUGH: ICD-10-CM

## 2025-04-07 DIAGNOSIS — J32.9 SINUSITIS, UNSPECIFIED CHRONICITY, UNSPECIFIED LOCATION: ICD-10-CM

## 2025-04-07 LAB
ABSOLUTE EOSINOPHIL (OHS): 0.41 K/UL
ABSOLUTE MONOCYTE (OHS): 0.8 K/UL (ref 0.3–1)
ABSOLUTE NEUTROPHIL COUNT (OHS): 5.7 K/UL (ref 1.8–7.7)
ALBUMIN SERPL BCP-MCNC: 3.2 G/DL (ref 3.5–5.2)
ALP SERPL-CCNC: 167 UNIT/L (ref 40–150)
ALT SERPL W/O P-5'-P-CCNC: 28 UNIT/L (ref 10–44)
ANION GAP (OHS): 9 MMOL/L (ref 8–16)
AST SERPL-CCNC: 29 UNIT/L (ref 11–45)
BASOPHILS # BLD AUTO: 0.03 K/UL
BASOPHILS NFR BLD AUTO: 0.3 %
BILIRUB SERPL-MCNC: 0.5 MG/DL (ref 0.1–1)
BUN SERPL-MCNC: 20 MG/DL (ref 8–23)
CALCIUM SERPL-MCNC: 9.7 MG/DL (ref 8.7–10.5)
CHLORIDE SERPL-SCNC: 104 MMOL/L (ref 95–110)
CO2 SERPL-SCNC: 26 MMOL/L (ref 23–29)
CREAT SERPL-MCNC: 0.8 MG/DL (ref 0.5–1.4)
ERYTHROCYTE [DISTWIDTH] IN BLOOD BY AUTOMATED COUNT: 12 % (ref 11.5–14.5)
GFR SERPLBLD CREATININE-BSD FMLA CKD-EPI: >60 ML/MIN/1.73/M2
GLUCOSE SERPL-MCNC: 151 MG/DL (ref 70–110)
HCT VFR BLD AUTO: 39.2 % (ref 37–48.5)
HGB BLD-MCNC: 12.9 GM/DL (ref 12–16)
IMM GRANULOCYTES # BLD AUTO: 0.07 K/UL (ref 0–0.04)
IMM GRANULOCYTES NFR BLD AUTO: 0.8 % (ref 0–0.5)
LYMPHOCYTES # BLD AUTO: 2.23 K/UL (ref 1–4.8)
MCH RBC QN AUTO: 29.8 PG (ref 27–31)
MCHC RBC AUTO-ENTMCNC: 32.9 G/DL (ref 32–36)
MCV RBC AUTO: 91 FL (ref 82–98)
NUCLEATED RBC (/100WBC) (OHS): 0 /100 WBC
PLATELET # BLD AUTO: 333 K/UL (ref 150–450)
PMV BLD AUTO: 9.4 FL (ref 9.2–12.9)
POTASSIUM SERPL-SCNC: 4.8 MMOL/L (ref 3.5–5.1)
PROT SERPL-MCNC: 7.8 GM/DL (ref 6–8.4)
RBC # BLD AUTO: 4.33 M/UL (ref 4–5.4)
RELATIVE EOSINOPHIL (OHS): 4.4 %
RELATIVE LYMPHOCYTE (OHS): 24.1 % (ref 18–48)
RELATIVE MONOCYTE (OHS): 8.7 % (ref 4–15)
RELATIVE NEUTROPHIL (OHS): 61.7 % (ref 38–73)
SODIUM SERPL-SCNC: 139 MMOL/L (ref 136–145)
WBC # BLD AUTO: 9.24 K/UL (ref 3.9–12.7)

## 2025-04-07 PROCEDURE — 63600175 PHARM REV CODE 636 W HCPCS: Performed by: EMERGENCY MEDICINE

## 2025-04-07 PROCEDURE — 71046 X-RAY EXAM CHEST 2 VIEWS: CPT | Mod: 26,,, | Performed by: RADIOLOGY

## 2025-04-07 PROCEDURE — 85025 COMPLETE CBC W/AUTO DIFF WBC: CPT | Performed by: EMERGENCY MEDICINE

## 2025-04-07 PROCEDURE — 71046 X-RAY EXAM CHEST 2 VIEWS: CPT | Mod: TC

## 2025-04-07 PROCEDURE — 80053 COMPREHEN METABOLIC PANEL: CPT | Performed by: EMERGENCY MEDICINE

## 2025-04-07 PROCEDURE — 99285 EMERGENCY DEPT VISIT HI MDM: CPT | Mod: 25

## 2025-04-07 PROCEDURE — 96374 THER/PROPH/DIAG INJ IV PUSH: CPT

## 2025-04-07 PROCEDURE — 25500020 PHARM REV CODE 255: Performed by: EMERGENCY MEDICINE

## 2025-04-07 PROCEDURE — 96375 TX/PRO/DX INJ NEW DRUG ADDON: CPT

## 2025-04-07 PROCEDURE — 70487 CT MAXILLOFACIAL W/DYE: CPT | Mod: TC

## 2025-04-07 RX ORDER — CLINDAMYCIN HYDROCHLORIDE 150 MG/1
300 CAPSULE ORAL EVERY 8 HOURS
Qty: 42 CAPSULE | Refills: 0 | Status: SHIPPED | OUTPATIENT
Start: 2025-04-07 | End: 2025-04-14

## 2025-04-07 RX ORDER — PROMETHAZINE HYDROCHLORIDE 25 MG/1
25 TABLET ORAL EVERY 6 HOURS PRN
Qty: 15 TABLET | Refills: 0 | Status: SHIPPED | OUTPATIENT
Start: 2025-04-07

## 2025-04-07 RX ORDER — OXYCODONE HCL 10 MG/1
10 TABLET, FILM COATED, EXTENDED RELEASE ORAL EVERY 12 HOURS
Qty: 10 TABLET | Refills: 0 | Status: SHIPPED | OUTPATIENT
Start: 2025-04-07 | End: 2025-04-07

## 2025-04-07 RX ORDER — OXYCODONE HCL 10 MG/1
10 TABLET, FILM COATED, EXTENDED RELEASE ORAL EVERY 12 HOURS
Qty: 10 TABLET | Refills: 0 | Status: SHIPPED | OUTPATIENT
Start: 2025-04-07

## 2025-04-07 RX ORDER — MORPHINE SULFATE 2 MG/ML
4 INJECTION, SOLUTION INTRAMUSCULAR; INTRAVENOUS
Refills: 0 | Status: COMPLETED | OUTPATIENT
Start: 2025-04-07 | End: 2025-04-07

## 2025-04-07 RX ORDER — ONDANSETRON HYDROCHLORIDE 2 MG/ML
4 INJECTION, SOLUTION INTRAVENOUS
Status: COMPLETED | OUTPATIENT
Start: 2025-04-07 | End: 2025-04-07

## 2025-04-07 RX ORDER — PREDNISONE 10 MG/1
10 TABLET ORAL DAILY
Qty: 21 TABLET | Refills: 0 | Status: SHIPPED | OUTPATIENT
Start: 2025-04-07

## 2025-04-07 RX ADMIN — ONDANSETRON 4 MG: 2 INJECTION INTRAMUSCULAR; INTRAVENOUS at 08:04

## 2025-04-07 RX ADMIN — IOHEXOL 75 ML: 350 INJECTION, SOLUTION INTRAVENOUS at 09:04

## 2025-04-07 RX ADMIN — MORPHINE SULFATE 4 MG: 2 INJECTION, SOLUTION INTRAMUSCULAR; INTRAVENOUS at 08:04

## 2025-04-07 NOTE — ED PROVIDER NOTES
"Encounter Date: 4/7/2025       History     Chief Complaint   Patient presents with    Cough    Fever    Chest Congestion     Patient reports being sick x 2 weeks. Seen at Urgent Care and given Pcn  which she completed.  .     65-year-old female, here from home several complaints.  She states that about 2 weeks ago she was diagnosed with the flu.  She took Tamiflu, and an antibiotic, and is over most of the symptoms except for a persistent nonproductive cough.  Denies shortness of breath.  She also complains of left facial pain and left earache.  She has tenderness and swelling around the left parotid duct.  Denies fevers or chills, but she states that her temperature is normally about 97, and today it is 99.4.  Triage vital signs show blood pressure 112/54, temperature 99.4°, pulse 84, respiratory rate 15, O2 saturations 97% room air.      Review of patient's allergies indicates:   Allergen Reactions    Acetaminophen Other (See Comments)     Pt has had a whipple surgery- and is unable to take any products with tylenol    Codeine     Hydrocodone-acetaminophen Other (See Comments)     "makes me delirious'    Pt had lengthy conversation with Dr Reyes about meds she can and cannot take    Sulfa (sulfonamide antibiotics)      Past Medical History:   Diagnosis Date    Cancer     Hypothyroidism, unspecified     Rheumatoid arthritis, unspecified     Sjogren's disease     Type 1 diabetes mellitus      Past Surgical History:   Procedure Laterality Date    PANCREAS SURGERY       No family history on file.  Social History[1]  Review of Systems   Constitutional:  Negative for chills, fatigue and fever.   HENT:  Positive for ear pain (left). Negative for congestion, facial swelling, hearing loss, rhinorrhea, sinus pain, sneezing, sore throat and trouble swallowing.         Complains of pain and swelling on the left buccal mucosa   Respiratory:  Positive for cough. Negative for shortness of breath and wheezing.    Cardiovascular:  " Negative for chest pain and palpitations.   Gastrointestinal:  Negative for abdominal pain, constipation, diarrhea, nausea and vomiting.   Endocrine: Negative for polydipsia and polyuria.   Genitourinary:  Negative for dysuria, flank pain and frequency.   Musculoskeletal:  Negative for back pain, myalgias, neck pain and neck stiffness.   Skin:  Negative for pallor and rash.   Neurological:  Negative for dizziness, syncope, weakness and light-headedness.   Psychiatric/Behavioral:  Negative for confusion. The patient is not nervous/anxious.        Physical Exam     Initial Vitals [04/07/25 0744]   BP Pulse Resp Temp SpO2   (!) 121/57 95 18 99.4 °F (37.4 °C) 98 %      MAP       --         Physical Exam    Nursing note and vitals reviewed.  Constitutional: She appears well-developed and well-nourished. She is not diaphoretic. No distress.   HENT:   Head: Normocephalic and atraumatic.   Right Ear: External ear normal.   Left Ear: External ear normal.   Nose: Nose normal. Mouth/Throat: No oropharyngeal exudate.   Stensen's duct on the left is erythematous and mildly edematous with a slightly ulcerated appearance.  No palpable stones are noted.  The area is tender in general.  No appreciable abscess with palpation.  No obvious facial swelling, no facial erythema etc..  No obvious lymphadenopathy.   Eyes: Conjunctivae and EOM are normal. Pupils are equal, round, and reactive to light. No scleral icterus.   Neck: Neck supple. No thyromegaly present. No JVD present.   Normal range of motion.  Cardiovascular:  Normal rate, regular rhythm, normal heart sounds and intact distal pulses.           No murmur heard.  Pulmonary/Chest: Breath sounds normal. No stridor. No respiratory distress. She has no wheezes. She has no rhonchi.   Abdominal: Abdomen is soft. Bowel sounds are normal. She exhibits no distension. There is no abdominal tenderness.   Musculoskeletal:         General: No tenderness or edema. Normal range of motion.       Cervical back: Normal range of motion and neck supple.     Neurological: She is alert and oriented to person, place, and time. She has normal strength. No cranial nerve deficit or sensory deficit. GCS score is 15. GCS eye subscore is 4. GCS verbal subscore is 5. GCS motor subscore is 6.   Skin: Skin is warm and dry. Capillary refill takes less than 2 seconds. No rash noted. No erythema.   Psychiatric: She has a normal mood and affect. Her behavior is normal.         ED Course   Procedures  Labs Reviewed   COMPREHENSIVE METABOLIC PANEL - Abnormal       Result Value    Sodium 139      Potassium 4.8      Chloride 104      CO2 26      Glucose 151 (*)     BUN 20      Creatinine 0.8      Calcium 9.7      Protein Total 7.8      Albumin 3.2 (*)     Bilirubin Total 0.5       (*)     AST 29      ALT 28      Anion Gap 9      eGFR >60     CBC WITH DIFFERENTIAL - Abnormal    WBC 9.24      RBC 4.33      HGB 12.9      HCT 39.2      MCV 91      MCH 29.8      MCHC 32.9      RDW 12.0      Platelet Count 333      MPV 9.4      Nucleated RBC 0      Neut % 61.7      Lymph % 24.1      Mono % 8.7      Eos % 4.4      Basophil % 0.3      Imm Grans % 0.8 (*)     Neut # 5.70      Lymph # 2.23      Mono # 0.80      Eos # 0.41      Baso # 0.03      Imm Grans # 0.07 (*)    CBC W/ AUTO DIFFERENTIAL    Narrative:     The following orders were created for panel order CBC auto differential.  Procedure                               Abnormality         Status                     ---------                               -----------         ------                     CBC with Differential[[id number]]       Abnormal            Final result                 Please view results for these tests on the individual orders.          Imaging Results              CT Maxillofacial With Contrast (Final result)  Result time 04/07/25 10:33:25      Final result by Prasanna Caban MD (04/07/25 10:33:25)                   Impression:      1. Left maxillary, right  frontal and ethmoid sinusitis.  2. No significant lymphadenopathy.      Electronically signed by: Prasanna Caban  Date:    04/07/2025  Time:    10:33               Narrative:    EXAMINATION:  CT MAXILLOFACIAL WITH CONTRAST    CLINICAL HISTORY:  Maxillary/facial abscess;    TECHNIQUE:  Multiple contiguous axial images were obtained through the face following the intravenous administration of 75 cc of Omnipaque 350.  Coronal sagittal re-formatted images reconstructed.    COMPARISON:  [facility] 03/25/2024.    FINDINGS:  The glottic structures are intact.  No significant prevertebral soft tissue swelling.  Epiglottis and uvula intact.  No significant hypertrophy of the palatine and sublingual tonsil.  No significant hypertrophy of the adenoid tissue.  Right left parapharyngeal spaces are preserved.    Parotid glands are mildly atrophic but enhance homogeneously.  There is atrophy of the bilateral submandibular glands.  No significant cervical or submandibular lymphadenopathy.  No significant subcutaneous inflammatory change.    Moderate air-fluid level within the left maxillary sinus.  Moderate dependent mucoperiosteal thickening of the right maxillary sinus.  There is scattered opacification throughout the bilateral ethmoid air cells.  Minimal leftward deviation of the nasal septum.  Mild to moderate turbinate hypertrophy.    Bilateral mastoid air cells and middle ears are normally pneumatized.                                       X-Ray Chest PA And Lateral (Final result)  Result time 04/07/25 08:49:28      Final result by Prasanna Caban MD (04/07/25 08:49:28)                   Impression:      No acute chest disease.      Electronically signed by: Prasanna Caban  Date:    04/07/2025  Time:    08:49               Narrative:    EXAMINATION:  XR CHEST PA AND LATERAL    CLINICAL HISTORY:  Cough, unspecified    TECHNIQUE:  PA and lateral views of the chest were performed.    COMPARISON:  12/18/2024 and  03/25/2024.    FINDINGS:  Small calcified granulomas again identified.  No focal consolidation.  Heart size is normal.  Mediastinal contours unremarkable.    Bony thorax intact.                                    X-Rays:   Independently Interpreted Readings:   Other Readings:  Chest x-ray personally reviewed by me shows clear lungs bilaterally.  Normal cardiac silhouette, normal skeletal structures.    CT, personally reviewed by me shows moderate air-fluid level in the left maxillary sinus and moderate mucoperiosteal thickening of the right maxillary sinus, as well as scattered opacification throughout the bilateral ethmoid air cells.  No significant lymphadenopathy, parotid glands are mildly atrophic but enhance homogeneously.  No significant cervical or submandibular lymphadenopathy.  No obvious inflammatory change, no stones, abscess, or masses noted        Medications   morphine injection 4 mg (4 mg Intravenous Given 4/7/25 0857)   ondansetron injection 4 mg (4 mg Intravenous Given 4/7/25 0859)   iohexoL (OMNIPAQUE 350) injection 75 mL (75 mLs Intravenous Given 4/7/25 0957)     Medical Decision Making  Differential includes salivary gland stone, salivary gland abscess, other facial abscess, sinusitis, otitis media, a dental infection, pneumonia, bronchitis, etc.    Labs unremarkable, chest x-ray clear, CT maxillofacial structures with contrast showed no evidence of salivary gland stone, no abscess, no obvious mass.  CT did, however, show evidence for sinusitis.  Will start the patient on clindamycin, will also prescribe prednisolone, and for pain, will prescribe OxyContin.  She can not take Percocet or Norco secondary to the Tylenol content.  Appropriate warnings given.  Patient will follow-up with her primary care provider, and she has been given contact information to follow-up with the ENT as needed.  Return here if worse.    Addendum:  Patient called after discharge and stated that the [facility] pharmacy in  [place] did not have the oxycodone so she requested that we transfer the prescription to [facility] pharmacy.  This was done, and patient also requested a prescription for Phenergan, which was also called in.    Amount and/or Complexity of Data Reviewed  Labs: ordered.  Radiology: ordered.    Risk  Prescription drug management.                                      Clinical Impression:  Final diagnoses:  [R05.9] Cough  [K11.20] Parotid sialoadenitis (Primary)  [J40] Bronchitis  [J32.9] Sinusitis, unspecified chronicity, unspecified location  [R11.0] Nausea          ED Disposition Condition    Discharge Stable          ED Prescriptions       Medication Sig Dispense Start Date End Date Auth. Provider    clindamycin (CLEOCIN) 150 MG capsule Take 2 capsules (300 mg total) by mouth every 8 (eight) hours. for 7 days 42 capsule 4/7/2025 4/14/2025 Siddhartha Segura MD    predniSONE (DELTASONE) 10 MG tablet Take 1 tablet (10 mg total) by mouth once daily. Take 4 tabs x 3 days, then take 2 tabs x 3 days, then take 1 tab x 3 days. 21 tablet 4/7/2025 -- Siddhartha Segura MD    oxyCODONE (OXYCONTIN) 10 mg 12 hr tablet  (Status: Discontinued) Take 1 tablet (10 mg total) by mouth every 12 (twelve) hours. 10 tablet 4/7/2025 4/7/2025 Siddhartha Segura MD    oxyCODONE (OXYCONTIN) 10 mg 12 hr tablet  (Status: Discontinued) Take 1 tablet (10 mg total) by mouth every 12 (twelve) hours. 10 tablet 4/7/2025 4/7/2025 Siddhartha Segura MD    oxyCODONE (OXYCONTIN) 10 mg 12 hr tablet  (Status: Discontinued) Take 1 tablet (10 mg total) by mouth every 12 (twelve) hours. 10 tablet 4/7/2025 4/7/2025 Siddhartha Segura MD    oxyCODONE (OXYCONTIN) 10 mg 12 hr tablet Take 1 tablet (10 mg total) by mouth every 12 (twelve) hours. 10 tablet 4/7/2025 -- Siddhartha Segura MD    promethazine (PHENERGAN) 25 MG tablet Take 1 tablet (25 mg total) by mouth every 6 (six) hours as needed for Nausea. 15 tablet 4/7/2025 -- Siddhartha Segura MD          Follow-up  Information       Follow up With Specialties Details Why Contact Info    Barbara Rao MD Family Medicine Call today  [address] [place] [address]  [phone]      Alexandro Ma MD Otolaryngology  As needed [address] [facility] [place] [address]  [phone]      [facility] Emergency Dept Emergency Medicine  If symptoms worsen [address] [facility] [address] [phone]               Siddhartha Segura MD  04/07/25 1107         [1]   Social History  Tobacco Use    Smoking status: Never     Passive exposure: Never    Smokeless tobacco: Never   Substance Use Topics    Alcohol use: Not Currently    Drug use: Never        Siddhartha Segura MD  04/07/25 4990

## 2025-04-07 NOTE — DISCHARGE INSTRUCTIONS
As we discussed, your labs are unremarkable, chest x-ray was clear, and CT did not show any sign of abscess, mass, or salivary gland stone.  CT did show signs of a sinusitis.  Your cough is likely secondary to bronchitis.  Take the prescribed medications as directed, and be careful not to drive, use machinery or dangerous tools etc. or drink alcohol after taking OxyContin.  Follow-up with your primary care provider, and if your symptoms continue, follow-up with Dr. Ma, the local ENT.  Return here as needed or if worse in any way.

## 2025-04-07 NOTE — ED TRIAGE NOTES
Patient with cough congestion and a large abscess to her Jaw bone noted last evening .  Patient relays a history of Whipple in 2012.

## 2025-07-17 ENCOUNTER — OFFICE VISIT (OUTPATIENT)
Dept: OTOLARYNGOLOGY | Facility: CLINIC | Age: 65
End: 2025-07-17
Payer: MEDICARE

## 2025-07-17 VITALS — WEIGHT: 136 LBS | HEIGHT: 62 IN | BODY MASS INDEX: 25.03 KG/M2

## 2025-07-17 DIAGNOSIS — R49.0 HOARSENESS: Primary | ICD-10-CM

## 2025-07-17 PROCEDURE — 99999 PR PBB SHADOW E&M-EST. PATIENT-LVL II: CPT | Mod: PBBFAC,,, | Performed by: OTOLARYNGOLOGY

## 2025-07-17 RX ORDER — OMEPRAZOLE 40 MG/1
40 CAPSULE, DELAYED RELEASE ORAL
Qty: 60 CAPSULE | Refills: 3 | Status: SHIPPED | OUTPATIENT
Start: 2025-07-17 | End: 2025-11-14

## 2025-07-17 NOTE — PROGRESS NOTES
"Subjective:       Patient ID: Alexandria Tompkins is a 65 y.o. female.    Chief Complaint: Sore Throat (Patient here with c/o " I had covid in January and I never recovered my voice. 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 I've been on synthroid for a very long time. I feel like my esophagus clicks in and out of place." )      This 65-year-old Alevism  comes in because since the beginning of the year and seemingly beginning after a COVID infection has been quite hoarse she also has some tenderness to touch her thyroid cartilage as she demonstrates this to me and the change in her voice is quite obvious as we discuss this and understandably that is interfering with her communication has a  and singing.  She has been getting by with Chloraseptic and cough drops but things are not improving with time and that is been over six months now          Objective:      ENT Physical Exam    So her voice is really quite affected.  She has pitch changes and squeaks as we discuss this her voice is rough in character.    Her nasal exam is unremarkable her oropharynx looks normal I do not see any evidence of candidiasis she has a type 1 diabetic.  Her neck is without adenopathy and she mentioned some tenderness to palpation of the thyroid cartilage and manipulation of the cartilage     We discussed the benefits of a fiberoptic exam that include a better view of the larynx a better view of the anterior aspect of the larynx a more magnified view of the larynx hypopharynx and nasopharynx in the context of the patient's particular complaint and the patient wishes to proceed with this minor examination.  In this particular patient examination with the mirror was attempted but inadequate to provide the necessary exam.    Afrin and lidocaine were atomized into the nasal airway 5 or so minutes were given for the decongestant and anesthetic to take effect and then the flexible " fiberoptic laryngoscope was passed through the nasal cavity and the exam proceeded.    The left side was chosen to pass the scope and after the spray has been given an opportunity to decongest and anesthetize her nose the scope passed easily on the left side.  Her nasal cavity and nasopharynx were normal to examination     The scope passed easily into her oropharynx and the base of tongue vallecula epiglottis are normal to examine     Her larynx is remarkable for prominent redness more of the left vocal cord and a arytenoid in particular but also of the right.  The left vocal cord has some redness in the midportion the right one is just the posterior aspect of the larynx and of the arytenoids themselves.  That is no exudate that might suggest a candidiasis.        Assessment:       1. Hoarseness         Plan:          So I think she probably has reflux laryngitis it looks that way I do not see other explanations for her prominent hoarseness I am going to begin her on b.i.d. PPIs.  I want to see her back in a month we did discuss the issue with osteoporosis related to this category of medicine and she does have a history of being told she has some level of osteoporosis.  That is yet to be evaluated by a local physician who she is just acquired in any fashion I do not think she has has a bone density study.  She also takes Synthroid and takes it at the same time as calcium supplement she takes two Tums in the morning and I have mentioned to her that that category of medicine meaning fast acting antacids are best not taken at the same time as Synthroid it can affect that medications absorption and  effectiveness.    I want to see her back in a month and hopefully will have some improvement in her voice and can come down on the dosing as possible.  In the event that she has not seen improvement I would want to re scope her and see if the pattern of redness is changed and reconsider the diagnosis.